# Patient Record
Sex: FEMALE | Race: BLACK OR AFRICAN AMERICAN | NOT HISPANIC OR LATINO | URBAN - METROPOLITAN AREA
[De-identification: names, ages, dates, MRNs, and addresses within clinical notes are randomized per-mention and may not be internally consistent; named-entity substitution may affect disease eponyms.]

---

## 2019-08-31 ENCOUNTER — INPATIENT (INPATIENT)
Facility: HOSPITAL | Age: 29
LOS: 11 days | Discharge: HOME HEALTH | End: 2019-09-12
Attending: OBSTETRICS & GYNECOLOGY | Admitting: OBSTETRICS & GYNECOLOGY
Payer: MEDICAID

## 2019-08-31 VITALS
RESPIRATION RATE: 16 BRPM | TEMPERATURE: 98 F | SYSTOLIC BLOOD PRESSURE: 192 MMHG | HEART RATE: 64 BPM | DIASTOLIC BLOOD PRESSURE: 113 MMHG | OXYGEN SATURATION: 99 %

## 2019-08-31 DIAGNOSIS — O14.90 UNSPECIFIED PRE-ECLAMPSIA, UNSPECIFIED TRIMESTER: ICD-10-CM

## 2019-08-31 DIAGNOSIS — O26.899 OTHER SPECIFIED PREGNANCY RELATED CONDITIONS, UNSPECIFIED TRIMESTER: ICD-10-CM

## 2019-08-31 DIAGNOSIS — O16.9 UNSPECIFIED MATERNAL HYPERTENSION, UNSPECIFIED TRIMESTER: ICD-10-CM

## 2019-08-31 DIAGNOSIS — Z3A.00 WEEKS OF GESTATION OF PREGNANCY NOT SPECIFIED: ICD-10-CM

## 2019-08-31 LAB
ALBUMIN SERPL ELPH-MCNC: 3.5 G/DL — SIGNIFICANT CHANGE UP (ref 3.3–5)
ALP SERPL-CCNC: 139 U/L — HIGH (ref 40–120)
ALT FLD-CCNC: 21 U/L — SIGNIFICANT CHANGE UP (ref 4–33)
ANION GAP SERPL CALC-SCNC: 12 MMO/L — SIGNIFICANT CHANGE UP (ref 7–14)
APPEARANCE UR: SIGNIFICANT CHANGE UP
APTT BLD: 26 SEC — LOW (ref 27.5–36.3)
AST SERPL-CCNC: 31 U/L — SIGNIFICANT CHANGE UP (ref 4–32)
BACTERIA # UR AUTO: SIGNIFICANT CHANGE UP
BASOPHILS # BLD AUTO: 0.02 K/UL — SIGNIFICANT CHANGE UP (ref 0–0.2)
BASOPHILS NFR BLD AUTO: 0.3 % — SIGNIFICANT CHANGE UP (ref 0–2)
BILIRUB SERPL-MCNC: 0.4 MG/DL — SIGNIFICANT CHANGE UP (ref 0.2–1.2)
BILIRUB UR-MCNC: NEGATIVE — SIGNIFICANT CHANGE UP
BLD GP AB SCN SERPL QL: NEGATIVE — SIGNIFICANT CHANGE UP
BLOOD UR QL VISUAL: SIGNIFICANT CHANGE UP
BUN SERPL-MCNC: 10 MG/DL — SIGNIFICANT CHANGE UP (ref 7–23)
CALCIUM SERPL-MCNC: 9.1 MG/DL — SIGNIFICANT CHANGE UP (ref 8.4–10.5)
CHLORIDE SERPL-SCNC: 106 MMOL/L — SIGNIFICANT CHANGE UP (ref 98–107)
CO2 SERPL-SCNC: 20 MMOL/L — LOW (ref 22–31)
COLOR SPEC: YELLOW — SIGNIFICANT CHANGE UP
CREAT ?TM UR-MCNC: 206.9 MG/DL — SIGNIFICANT CHANGE UP
CREAT SERPL-MCNC: 0.87 MG/DL — SIGNIFICANT CHANGE UP (ref 0.5–1.3)
EOSINOPHIL # BLD AUTO: 0.06 K/UL — SIGNIFICANT CHANGE UP (ref 0–0.5)
EOSINOPHIL NFR BLD AUTO: 0.9 % — SIGNIFICANT CHANGE UP (ref 0–6)
FIBRINOGEN PPP-MCNC: 576 MG/DL — HIGH (ref 350–510)
GLUCOSE SERPL-MCNC: 75 MG/DL — SIGNIFICANT CHANGE UP (ref 70–99)
GLUCOSE UR-MCNC: NEGATIVE — SIGNIFICANT CHANGE UP
HBV SURFACE AG SER-ACNC: NEGATIVE — SIGNIFICANT CHANGE UP
HCT VFR BLD CALC: 33.1 % — LOW (ref 34.5–45)
HGB BLD-MCNC: 10.1 G/DL — LOW (ref 11.5–15.5)
HIV COMBO RESULT: SIGNIFICANT CHANGE UP
HIV1+2 AB SPEC QL: SIGNIFICANT CHANGE UP
HYALINE CASTS # UR AUTO: SIGNIFICANT CHANGE UP
IMM GRANULOCYTES NFR BLD AUTO: 1 % — SIGNIFICANT CHANGE UP (ref 0–1.5)
INR BLD: 0.96 — SIGNIFICANT CHANGE UP (ref 0.88–1.17)
KETONES UR-MCNC: NEGATIVE — SIGNIFICANT CHANGE UP
LDH SERPL L TO P-CCNC: 350 U/L — HIGH (ref 135–225)
LEUKOCYTE ESTERASE UR-ACNC: NEGATIVE — SIGNIFICANT CHANGE UP
LYMPHOCYTES # BLD AUTO: 1.39 K/UL — SIGNIFICANT CHANGE UP (ref 1–3.3)
LYMPHOCYTES # BLD AUTO: 20.5 % — SIGNIFICANT CHANGE UP (ref 13–44)
MAGNESIUM SERPL-MCNC: 6 MG/DL — HIGH (ref 1.6–2.6)
MCHC RBC-ENTMCNC: 25.1 PG — LOW (ref 27–34)
MCHC RBC-ENTMCNC: 30.5 % — LOW (ref 32–36)
MCV RBC AUTO: 82.1 FL — SIGNIFICANT CHANGE UP (ref 80–100)
MONOCYTES # BLD AUTO: 0.62 K/UL — SIGNIFICANT CHANGE UP (ref 0–0.9)
MONOCYTES NFR BLD AUTO: 9.2 % — SIGNIFICANT CHANGE UP (ref 2–14)
NEUTROPHILS # BLD AUTO: 4.61 K/UL — SIGNIFICANT CHANGE UP (ref 1.8–7.4)
NEUTROPHILS NFR BLD AUTO: 68.1 % — SIGNIFICANT CHANGE UP (ref 43–77)
NITRITE UR-MCNC: NEGATIVE — SIGNIFICANT CHANGE UP
NRBC # FLD: 0.02 K/UL — SIGNIFICANT CHANGE UP (ref 0–0)
PH UR: 6.5 — SIGNIFICANT CHANGE UP (ref 5–8)
PLATELET # BLD AUTO: 202 K/UL — SIGNIFICANT CHANGE UP (ref 150–400)
PMV BLD: 11.8 FL — SIGNIFICANT CHANGE UP (ref 7–13)
POTASSIUM SERPL-MCNC: 4.4 MMOL/L — SIGNIFICANT CHANGE UP (ref 3.5–5.3)
POTASSIUM SERPL-SCNC: 4.4 MMOL/L — SIGNIFICANT CHANGE UP (ref 3.5–5.3)
PROT SERPL-MCNC: 6.6 G/DL — SIGNIFICANT CHANGE UP (ref 6–8.3)
PROT UR-MCNC: 593.6 MG/DL — SIGNIFICANT CHANGE UP
PROT UR-MCNC: 600 — HIGH
PROTHROM AB SERPL-ACNC: 10.9 SEC — SIGNIFICANT CHANGE UP (ref 9.8–13.1)
RBC # BLD: 4.03 M/UL — SIGNIFICANT CHANGE UP (ref 3.8–5.2)
RBC # FLD: 15.9 % — HIGH (ref 10.3–14.5)
RBC CASTS # UR COMP ASSIST: HIGH (ref 0–?)
RH IG SCN BLD-IMP: POSITIVE — SIGNIFICANT CHANGE UP
RH IG SCN BLD-IMP: POSITIVE — SIGNIFICANT CHANGE UP
SODIUM SERPL-SCNC: 138 MMOL/L — SIGNIFICANT CHANGE UP (ref 135–145)
SP GR SPEC: 1.03 — SIGNIFICANT CHANGE UP (ref 1–1.04)
SQUAMOUS # UR AUTO: SIGNIFICANT CHANGE UP
URATE SERPL-MCNC: 4.6 MG/DL — SIGNIFICANT CHANGE UP (ref 2.5–7)
UROBILINOGEN FLD QL: NORMAL — SIGNIFICANT CHANGE UP
WBC # BLD: 6.77 K/UL — SIGNIFICANT CHANGE UP (ref 3.8–10.5)
WBC # FLD AUTO: 6.77 K/UL — SIGNIFICANT CHANGE UP (ref 3.8–10.5)
WBC UR QL: HIGH (ref 0–?)

## 2019-08-31 RX ORDER — HYDRALAZINE HCL 50 MG
10 TABLET ORAL ONCE
Refills: 0 | Status: COMPLETED | OUTPATIENT
Start: 2019-08-31 | End: 2019-08-31

## 2019-08-31 RX ORDER — HYDRALAZINE HCL 50 MG
5 TABLET ORAL ONCE
Refills: 0 | Status: COMPLETED | OUTPATIENT
Start: 2019-08-31 | End: 2019-08-31

## 2019-08-31 RX ORDER — SODIUM CHLORIDE 9 MG/ML
1000 INJECTION, SOLUTION INTRAVENOUS
Refills: 0 | Status: DISCONTINUED | OUTPATIENT
Start: 2019-08-31 | End: 2019-09-01

## 2019-08-31 RX ORDER — LABETALOL HCL 100 MG
20 TABLET ORAL ONCE
Refills: 0 | Status: COMPLETED | OUTPATIENT
Start: 2019-08-31 | End: 2019-08-31

## 2019-08-31 RX ORDER — NIFEDIPINE 30 MG
30 TABLET, EXTENDED RELEASE 24 HR ORAL DAILY
Refills: 0 | Status: DISCONTINUED | OUTPATIENT
Start: 2019-08-31 | End: 2019-09-02

## 2019-08-31 RX ORDER — ONDANSETRON 8 MG/1
4 TABLET, FILM COATED ORAL ONCE
Refills: 0 | Status: COMPLETED | OUTPATIENT
Start: 2019-08-31 | End: 2019-08-31

## 2019-08-31 RX ORDER — MAGNESIUM SULFATE 500 MG/ML
4 VIAL (ML) INJECTION ONCE
Refills: 0 | Status: COMPLETED | OUTPATIENT
Start: 2019-08-31 | End: 2019-08-31

## 2019-08-31 RX ORDER — PROGESTERONE 200 MG/1
100 CAPSULE, LIQUID FILLED ORAL AT BEDTIME
Refills: 0 | Status: DISCONTINUED | OUTPATIENT
Start: 2019-08-31 | End: 2019-09-07

## 2019-08-31 RX ORDER — MAGNESIUM SULFATE 500 MG/ML
2 VIAL (ML) INJECTION
Qty: 40 | Refills: 0 | Status: DISCONTINUED | OUTPATIENT
Start: 2019-08-31 | End: 2019-09-01

## 2019-08-31 RX ORDER — OXYTOCIN 10 UNIT/ML
333.33 VIAL (ML) INJECTION
Qty: 20 | Refills: 0 | Status: DISCONTINUED | OUTPATIENT
Start: 2019-08-31 | End: 2019-09-08

## 2019-08-31 RX ADMIN — Medication 50 GM/HR: at 19:17

## 2019-08-31 RX ADMIN — SODIUM CHLORIDE 50 MILLILITER(S): 9 INJECTION, SOLUTION INTRAVENOUS at 12:48

## 2019-08-31 RX ADMIN — Medication 5 MILLIGRAM(S): at 12:21

## 2019-08-31 RX ADMIN — Medication 10 MILLIGRAM(S): at 12:33

## 2019-08-31 RX ADMIN — Medication 300 GRAM(S): at 12:44

## 2019-08-31 RX ADMIN — Medication 30 MILLIGRAM(S): at 20:38

## 2019-08-31 RX ADMIN — Medication 12 MILLIGRAM(S): at 12:42

## 2019-08-31 RX ADMIN — Medication 20 MILLIGRAM(S): at 19:09

## 2019-08-31 RX ADMIN — Medication 50 GM/HR: at 13:08

## 2019-08-31 NOTE — OB PROVIDER H&P - NSHPLABSRESULTS_GEN_ALL_CORE
HELLP Labs  Routine Labs [facility] Labs  08-31    138  |  106  |  10  ----------------------------<  75  4.4   |  20<L>  |  0.87    Ca    9.1      31 Aug 2019 12:39    TPro  6.6  /  Alb  3.5  /  TBili  0.4  /  DBili  x   /  AST  31  /  ALT  21  /  AlkPhos  139<H>  08-31  P/C ratio =     Routine Labs [facility] Labs  08-31    138  |  106  |  10  ----------------------------<  75  4.4   |  20<L>  |  0.87    Ca    9.1      31 Aug 2019 12:39    TPro  6.6  /  Alb  3.5  /  TBili  0.4  /  DBili  x   /  AST  31  /  ALT  21  /  AlkPhos  139<H>  08-31    P/C ratio = 2.8    Routine Labs

## 2019-08-31 NOTE — OB PROVIDER H&P - NSHPREVIEWOFSYSTEMS_GEN_ALL_CORE
Hypertension  B/L Brisk Reflexes + 3  B/L Pedal Edema +2    Vital Signs Last 24 Hrs  T(C): 36.8 (31 Aug 2019 12:53), Max: 36.9 (31 Aug 2019 12:03)  T(F): 98.2 (31 Aug 2019 12:53), Max: 98.4 (31 Aug 2019 12:03)  HR: 115 (31 Aug 2019 13:00) (64 - 115)  BP: 148/78 (31 Aug 2019 13:00) (142/85 - 192/113)  BP(mean): --  RR: 19 (31 Aug 2019 12:53) (16 - 19)  SpO2: 99% (31 Aug 2019 12:57) (99% - 100%)

## 2019-08-31 NOTE — OB PROVIDER H&P - HISTORY OF PRESENT ILLNESS
30 yo  @ 30.5 wks GA sent from OB's private office for evaluation of elevated BP in the office at 158/88 and reports on sono today baby measuring SGA as per patient report.   Patient denies any LOF, VB, CTX, and reports good FM's. Patient also reports no HA' s , Visual changes or N/V, Epigastric pain at this time  PNC: Dr Infante   Reports AP course thus far uncomplicated 30 yo  @ 30.5 wks GA sent from OB's private office for evaluation of elevated BP in the office at 158/88 and reports on sono today baby measuring SGA as per patient report.   Patient denies any LOF, VB, CTX, and reports good FM's. Patient also reports no HA' s , Visual changes or N/V, Epigastric pain at this time  PNC: Dr Infante   Reports AP course  was on Progesterone suppositories  since 20 wks GA last cervical length = 2.2 cm

## 2019-08-31 NOTE — CHART NOTE - NSCHARTNOTEFT_GEN_A_CORE
R3 MFM Consult Note (Back Time due to patient care)    Patient is a 29y old  at 30w5d who presents with a chief complaint of elevated BP in Dr. Infante office to 158/88 with first BP on arrival to triage 192/113. Hydralazine 5 given at that time with repeat /103 and hydralazine 10 given with BPs now 150s/80s. Patient denies any HA, blurry vision, RUQ pain. Denies any history of elevated BP previously and reports overall uncomplicated pregnancy to date, however, later reported she was found to have shortened cervix back in  and has been on nightly vaginal progesterone. Patient reports good FM. Denies any VB or LOF. States she is asymptomatic.     PNC: Dr Infante   Reports AP course  was on Progesterone suppositories since 20 wks GA last cervical length = 2.2 cm (31 Aug 2019 13:01)      PAST MEDICAL & SURGICAL HISTORY:  No pertinent past medical history  No significant past surgical history    MEDICATIONS  (STANDING):  betamethasone Injectable 12 milliGRAM(s) IntraMuscular every 24 hours  labetalol Injectable 20 milliGRAM(s) IV Push once  lactated ringers. 1000 milliLiter(s) (50 mL/Hr) IV Continuous <Continuous>  magnesium sulfate Infusion 2 Gm/Hr (50 mL/Hr) IV Continuous <Continuous>  NIFEdipine XL 30 milliGRAM(s) Oral daily  oxytocin Infusion 333.333 milliUNIT(s)/Min (1000 mL/Hr) IV Continuous <Continuous>      Vital Signs Last 24 Hrs  T(C): 36.3 (31 Aug 2019 17:03), Max: 36.9 (31 Aug 2019 12:03)  T(F): 97.34 (31 Aug 2019 17:03), Max: 98.4 (31 Aug 2019 12:03)  HR: 118 (31 Aug 2019 19:04) (64 - 125)  BP: 166/96 (31 Aug 2019 19:02) (138/84 - 192/113)  BP(mean): --  RR: 19 (31 Aug 2019 12:53) (16 - 19)  SpO2: 99% (31 Aug 2019 19:04) (98% - 100%)    PHYSICAL EXAM:  Gen: NAD  CV: RRR  Pulm: CTAB  Abd: soft, gravid,nontender  Ext: DTR 3+; 2+ pedal edema bilaterally       I&O's Summary    31 Aug 2019 07:01  -  31 Aug 2019 19:09  --------------------------------------------------------  IN: 600 mL / OUT: 400 mL / NET: 200 mL        LABORATORY:                        10.1   6.77  )-----------( 202      ( 31 Aug 2019 12:39 )             33.1         138  |  106  |  10  ----------------------------<  75  4.4   |  20<L>  |  0.87    Ca    9.1      31 Aug 2019 12:39    TPro  6.6  /  Alb  3.5  /  TBili  0.4  /  DBili  x   /  AST  31  /  ALT  21  /  AlkPhos  139<H>      PT/INR - ( 31 Aug 2019 12:39 )   PT: 10.9 SEC;   INR: 0.96          PTT - ( 31 Aug 2019 12:39 )  PTT:26.0 SEC  Urinalysis Basic - ( 31 Aug 2019 12:39 )    Color: YELLOW / Appearance: Lt TURBID / S.027 / pH: 6.5  Gluc: NEGATIVE / Ketone: NEGATIVE  / Bili: NEGATIVE / Urobili: NORMAL   Blood: SMALL / Protein: 600 / Nitrite: NEGATIVE   Leuk Esterase: NEGATIVE / RBC: 11-25 / WBC 6-10   Sq Epi: MODERATE / Non Sq Epi: x / Bacteria: FEW      RADIOLOGY    Bedside sono performed by EDWIGE Caceres   Vertex, anterior placenta, MVP 5.7, EFW 1304g, BPP 8/8    TVUS performed by EDWIGE Caceres with myself at bedside   CL 2.4-2.8cm        A/P: 29y old  at 30w5d w/ history of shortened cervix this pregnancy, who presents with elevated BP, found to be sPEC and started on magnesium, in stable condition.   - Admit to Antepartum service    #sPEC/Mg  - S/p hydralazine 5 and hydralazine 10. Begin magnesium sulfate for seizure prophylaxis. Risks/benefits d/w patient and all questions addressed.   - Plan to continue to monitor BP, if oral anti-HTN indicated, will start Procardia 30XL     #MWB  - NPO for observation  - If not in labor, will begin HSQ    #FWB  - BMZ given for fetal lung maturity   - Continuous monitoring   - GBS obtained     Seen w/ Dr. Ramos Cervantes PGY3 R3 MFM Consult Note (Back Time due to patient care)    Patient is a 29y old  at 30w5d who presents with a chief complaint of elevated BP in Dr. Infante office to 158/88 with first BP on arrival to triage 192/113. Hydralazine 5 given at that time with repeat /103 and hydralazine 10 given with BPs now 150s/80s. Patient denies any HA, blurry vision, RUQ pain. Denies any history of elevated BP previously and reports overall uncomplicated pregnancy to date, however, later reported she was found to have shortened cervix back in  and has been on nightly vaginal progesterone. Patient reports good FM. Denies any VB or LOF. States she is asymptomatic.     PNC: Dr Infante   Reports AP course  was on Progesterone suppositories since 20 wks GA last cervical length = 2.2 cm (31 Aug 2019 13:01)      PAST MEDICAL & SURGICAL HISTORY:  No pertinent past medical history  No significant past surgical history    MEDICATIONS  (STANDING):  betamethasone Injectable 12 milliGRAM(s) IntraMuscular every 24 hours  labetalol Injectable 20 milliGRAM(s) IV Push once  lactated ringers. 1000 milliLiter(s) (50 mL/Hr) IV Continuous <Continuous>  magnesium sulfate Infusion 2 Gm/Hr (50 mL/Hr) IV Continuous <Continuous>  NIFEdipine XL 30 milliGRAM(s) Oral daily  oxytocin Infusion 333.333 milliUNIT(s)/Min (1000 mL/Hr) IV Continuous <Continuous>      VS on arrival:  /113  HR 64    PHYSICAL EXAM:  Gen: NAD  CV: RRR  Pulm: CTAB  Abd: soft, gravid,nontender  Ext: DTR 3+; 2+ pedal edema bilaterally     FHR: 145/mod/accels+/decels-  [place]: rare, infrequent      I&O's Summary    31 Aug 2019 07:01  -  31 Aug 2019 19:09  --------------------------------------------------------  IN: 600 mL / OUT: 400 mL / NET: 200 mL        LABORATORY:                        10.1   6.77  )-----------( 202      ( 31 Aug 2019 12:39 )             33.1     08-31    138  |  106  |  10  ----------------------------<  75  4.4   |  20<L>  |  0.87    Ca    9.1      31 Aug 2019 12:39    TPro  6.6  /  Alb  3.5  /  TBili  0.4  /  DBili  x   /  AST  31  /  ALT  21  /  AlkPhos  139<H>      PT/INR - ( 31 Aug 2019 12:39 )   PT: 10.9 SEC;   INR: 0.96          PTT - ( 31 Aug 2019 12:39 )  PTT:26.0 SEC  Urinalysis Basic - ( 31 Aug 2019 12:39 )    Color: YELLOW / Appearance: Lt TURBID / S.027 / pH: 6.5  Gluc: NEGATIVE / Ketone: NEGATIVE  / Bili: NEGATIVE / Urobili: NORMAL   Blood: SMALL / Protein: 600 / Nitrite: NEGATIVE   Leuk Esterase: NEGATIVE / RBC: 11-25 / WBC 6-10   Sq Epi: MODERATE / Non Sq Epi: x / Bacteria: FEW      RADIOLOGY    Bedside sono performed by EDWIGE Caceres   Vertex, anterior placenta, MVP 5.7, EFW 1304g, BPP 8/8    TVUS performed by EDWIGE Caceres with myself at bedside   CL 2.4-2.8cm        A/P: 29y old  at 30w5d w/ history of shortened cervix this pregnancy, who presents with elevated BP, found to be sPEC and started on magnesium, in stable condition.   - Admit to Antepartum service    #sPEC/Mg  - S/p hydralazine 5 and hydralazine 10. Begin magnesium sulfate for seizure prophylaxis. Risks/benefits d/w patient and all questions addressed.   - Plan to continue to monitor BP, if oral anti-HTN indicated, will start Procardia 30XL     #MWB  - NPO for observation  - If not in labor, will begin HSQ    #FWB  - BMZ given for fetal lung maturity   - Continuous monitoring   - GBS obtained     Seen w/ Dr. Ramos Cervantes PGY3 R3 MFM Consult Note (Back Time due to patient care)    Patient is a 29y old  at 30w5d who presents with a chief complaint of elevated BP in Dr. Infante office to 158/88 with first BP on arrival to triage 192/113. Hydralazine 5 given at that time with repeat /103 and hydralazine 10 given with BPs now 150s/80s. Patient denies any HA, blurry vision, RUQ pain. Denies any history of elevated BP previously and reports overall uncomplicated pregnancy to date, however, later reported she was found to have shortened cervix back in  and has been on nightly vaginal progesterone. Patient reports good FM. Denies any VB or LOF. States she is asymptomatic.     PNC: Dr Infante   Reports AP course  was on Progesterone suppositories since 20 wks GA last cervical length = 2.2 cm (31 Aug 2019 13:01)      PAST MEDICAL & SURGICAL HISTORY:  No pertinent past medical history  No significant past surgical history    MEDICATIONS  (STANDING):  betamethasone Injectable 12 milliGRAM(s) IntraMuscular every 24 hours  labetalol Injectable 20 milliGRAM(s) IV Push once  lactated ringers. 1000 milliLiter(s) (50 mL/Hr) IV Continuous <Continuous>  magnesium sulfate Infusion 2 Gm/Hr (50 mL/Hr) IV Continuous <Continuous>  NIFEdipine XL 30 milliGRAM(s) Oral daily  oxytocin Infusion 333.333 milliUNIT(s)/Min (1000 mL/Hr) IV Continuous <Continuous>      VS on arrival:  /113  HR 64    PHYSICAL EXAM:  Gen: NAD  CV: RRR  Pulm: CTAB  Abd: soft, gravid,nontender  Ext: DTR 3+; 2+ pedal edema bilaterally     FHR: 145/mod/accels+/decels-  [place]: rare, infrequent      I&O's Summary    31 Aug 2019 07:01  -  31 Aug 2019 19:09  --------------------------------------------------------  IN: 600 mL / OUT: 400 mL / NET: 200 mL        LABORATORY:                        10.1   6.77  )-----------( 202      ( 31 Aug 2019 12:39 )             33.1     08-31    138  |  106  |  10  ----------------------------<  75  4.4   |  20<L>  |  0.87    Ca    9.1      31 Aug 2019 12:39    TPro  6.6  /  Alb  3.5  /  TBili  0.4  /  DBili  x   /  AST  31  /  ALT  21  /  AlkPhos  139<H>      PT/INR - ( 31 Aug 2019 12:39 )   PT: 10.9 SEC;   INR: 0.96          PTT - ( 31 Aug 2019 12:39 )  PTT:26.0 SEC  Urinalysis Basic - ( 31 Aug 2019 12:39 )    Color: YELLOW / Appearance: Lt TURBID / S.027 / pH: 6.5  Gluc: NEGATIVE / Ketone: NEGATIVE  / Bili: NEGATIVE / Urobili: NORMAL   Blood: SMALL / Protein: 600 / Nitrite: NEGATIVE   Leuk Esterase: NEGATIVE / RBC: 11-25 / WBC 6-10   Sq Epi: MODERATE / Non Sq Epi: x / Bacteria: FEW      RADIOLOGY    Bedside sono performed by EDWIGE Caceres   Vertex, anterior placenta, MVP 5.7, EFW 1304g, BPP 8/8    TVUS performed by EDWIGE Caceres with myself at bedside   CL 2.4-2.8cm        A/P: 29y old  at 30w5d w/ history of shortened cervix this pregnancy, who presents with elevated BP, found to be sPEC and started on magnesium, in stable condition.   - Admit to Antepartum service    #sPEC/Mg  - S/p hydralazine 5 and hydralazine 10. Begin magnesium sulfate for seizure prophylaxis. Risks/benefits d/w patient and all questions addressed.   - Plan to continue to monitor BP, if oral anti-HTN indicated, will start Procardia 30XL     #MWB  - NPO for observation  - If not in labor, will begin HSQ    #FWB  - BMZ given for fetal lung maturity   - Continuous monitoring   - GBS obtained     Seen w/ Dr. Ramos Cervantes PGY3        MFM Attending  Patient seen and evaluated at the bedside. New onset HTN and asymptomatic. Differential includes pre-eclampsia with or without severe features. I agree with the above plan and counseled the patient. Magnesium for seizure prophylaxis until we decide plan based on BP and symtpoms.  Dr. Beasley

## 2019-08-31 NOTE — OB PROVIDER H&P - ASSESSMENT
28 yo  @ 30.5 wks GA admitted for Severe Preeclampsia  - HELLP Labs   - BP Control  - IVP Medications administered after severe range BP's of 179/103 and 168/101 with Pulse rate in mid 60's initially  Hydralazine IVP administered 5 mg x1 , 10 mg x 1 (Will continue ro observe and cycle BP's)  - MFM ( Dr Beasley at the bedside for evaluation)  - Magnesium fo seizure prophylaxis  - Betamethasone for fetal maturity  - NICU Consult  - EFW Sono to be performed  - See all admission orders as Dw Dr Infante (OB Attending) and Dr Beasley (MFM Attending)

## 2019-08-31 NOTE — OB PROVIDER TRIAGE NOTE - HISTORY OF PRESENT ILLNESS
28 yo  @ 30.5 wks GA sent from OB's private office for evaluation of elevated BP in the office at 158/88 and reports on sono today baby measuring SGA as per patient report.   Patient denies any LOF, VB, CTX, and reports good FM's. Patient also reports no HA' s , Visual changes or N/V, Epigastric pain at this time  PNC: Dr Infante   Reports AP course thus far uncomplicated

## 2019-08-31 NOTE — OB RN PATIENT PROFILE - NS_CONTACTNUMBEROFSUPPORTPERSON_OBGYN_ALL_OB_FT
Other (Free Text): Eczema doing well Note Text (......Xxx Chief Complaint.): This diagnosis correlates with the Detail Level: Detailed [id number]

## 2019-08-31 NOTE — OB PROVIDER TRIAGE NOTE - NSHPPHYSICALEXAM_GEN_ALL_CORE
A/O x 3  Vital Signs Last 24 Hrs  T(C): 36.9 (31 Aug 2019 12:03), Max: 36.9 (31 Aug 2019 12:03)  T(F): 98.4 (31 Aug 2019 12:03), Max: 98.4 (31 Aug 2019 12:03)  HR: 113 (31 Aug 2019 12:52) (64 - 113)  BP: 142/85 (31 Aug 2019 12:50) (142/85 - 192/113)  BP(mean): --  RR: 16 (31 Aug 2019 12:03) (16 - 16)  SpO2: 99% (31 Aug 2019 12:52) (99% - 100%)  Abdomen is soft NT and gravid with no pain in RUQ pain illicited  DTR (3 + Brisk) B/L  2 + pedal edema

## 2019-08-31 NOTE — OB PROVIDER TRIAGE NOTE - PMH
<<----- Click to add NO pertinent Past Medical History No pertinent past medical history PAIN/TENDERNESS

## 2019-09-01 DIAGNOSIS — O16.9 UNSPECIFIED MATERNAL HYPERTENSION, UNSPECIFIED TRIMESTER: ICD-10-CM

## 2019-09-01 LAB
MAGNESIUM SERPL-MCNC: 5.8 MG/DL — HIGH (ref 1.6–2.6)
MAGNESIUM SERPL-MCNC: 6.9 MG/DL — HIGH (ref 1.6–2.6)
T PALLIDUM AB TITR SER: NEGATIVE — SIGNIFICANT CHANGE UP

## 2019-09-01 RX ORDER — MAGNESIUM SULFATE 500 MG/ML
1.5 VIAL (ML) INJECTION
Qty: 40 | Refills: 0 | Status: DISCONTINUED | OUTPATIENT
Start: 2019-09-01 | End: 2019-09-01

## 2019-09-01 RX ORDER — HEPARIN SODIUM 5000 [USP'U]/ML
5000 INJECTION INTRAVENOUS; SUBCUTANEOUS EVERY 12 HOURS
Refills: 0 | Status: DISCONTINUED | OUTPATIENT
Start: 2019-09-01 | End: 2019-09-02

## 2019-09-01 RX ORDER — SODIUM CHLORIDE 9 MG/ML
3 INJECTION INTRAMUSCULAR; INTRAVENOUS; SUBCUTANEOUS EVERY 8 HOURS
Refills: 0 | Status: DISCONTINUED | OUTPATIENT
Start: 2019-09-01 | End: 2019-09-07

## 2019-09-01 RX ORDER — SODIUM CHLORIDE 9 MG/ML
1000 INJECTION, SOLUTION INTRAVENOUS
Refills: 0 | Status: DISCONTINUED | OUTPATIENT
Start: 2019-09-01 | End: 2019-09-03

## 2019-09-01 RX ADMIN — SODIUM CHLORIDE 62.5 MILLILITER(S): 9 INJECTION, SOLUTION INTRAVENOUS at 12:26

## 2019-09-01 RX ADMIN — Medication 12 MILLIGRAM(S): at 12:25

## 2019-09-01 RX ADMIN — PROGESTERONE 100 MILLIGRAM(S): 200 CAPSULE, LIQUID FILLED ORAL at 01:38

## 2019-09-01 RX ADMIN — SODIUM CHLORIDE 3 MILLILITER(S): 9 INJECTION INTRAMUSCULAR; INTRAVENOUS; SUBCUTANEOUS at 22:17

## 2019-09-01 RX ADMIN — SODIUM CHLORIDE 50 MILLILITER(S): 9 INJECTION, SOLUTION INTRAVENOUS at 07:33

## 2019-09-01 RX ADMIN — Medication 30 MILLIGRAM(S): at 20:32

## 2019-09-01 RX ADMIN — PROGESTERONE 100 MILLIGRAM(S): 200 CAPSULE, LIQUID FILLED ORAL at 22:17

## 2019-09-01 RX ADMIN — Medication 37.5 GM/HR: at 08:04

## 2019-09-01 RX ADMIN — Medication 1 TABLET(S): at 15:04

## 2019-09-01 RX ADMIN — HEPARIN SODIUM 5000 UNIT(S): 5000 INJECTION INTRAVENOUS; SUBCUTANEOUS at 20:00

## 2019-09-01 RX ADMIN — HEPARIN SODIUM 5000 UNIT(S): 5000 INJECTION INTRAVENOUS; SUBCUTANEOUS at 07:33

## 2019-09-01 NOTE — PROGRESS NOTE ADULT - SUBJECTIVE AND OBJECTIVE BOX
R3 Antepartum Progress Note - HD#2    Subjective  Patient seen and examined at bedside, no acute overnight events. This AM, no acute complaints. Reports good FM and denies     Objective  Vital Signs Last 24 Hours  T(C): 36.5 (09-01-19 @ 01:29), Max: 36.9 (08-31-19 @ 12:03)  HR: 88 (09-01-19 @ 03:09) (64 - 126)  BP: 142/95 (09-01-19 @ 03:08) (130/79 - 192/113)  RR: 19 (08-31-19 @ 12:53) (16 - 19)  SpO2: 98% (09-01-19 @ 03:09) (96% - 100%)    Physical Exam:  General: NAD  Abdomen: Soft, non-tender, non-distended, fundus firm  Pelvic: Lochia wnl    Labs:    Blood Type: A Positive  Antibody Screen: --               10.1   6.77  )-----------( 202      ( 08-31 @ 12:39 )             33.1         MEDICATIONS  (STANDING):  betamethasone Injectable 12 milliGRAM(s) IntraMuscular every 24 hours  lactated ringers. 1000 milliLiter(s) (50 mL/Hr) IV Continuous <Continuous>  magnesium sulfate Infusion 2 Gm/Hr (50 mL/Hr) IV Continuous <Continuous>  NIFEdipine XL 30 milliGRAM(s) Oral daily  ondansetron Injectable 4 milliGRAM(s) IV Push once  oxytocin Infusion 333.333 milliUNIT(s)/Min (1000 mL/Hr) IV Continuous <Continuous>  progesterone Vaginal Insert 100 milliGRAM(s) Vaginal at bedtime    MEDICATIONS  (PRN): R3 Antepartum Progress Note - HD#2    Subjective  Patient seen and examined at bedside, no acute overnight events. This AM, no acute complaints. Reports good FM and denies CTX, LOF, VB. Denies HA, changes in vision, CP, SOB, palpitations, LE edema/tenderness.      Objective  Vital Signs Last 24 Hours  T(C): 36.5 (09-01-19 @ 01:29), Max: 36.9 (08-31-19 @ 12:03)  HR: 88 (09-01-19 @ 03:09) (64 - 126)  BP: 142/95 (09-01-19 @ 03:08) (130/79 - 192/113)  RR: 19 (08-31-19 @ 12:53) (16 - 19)  SpO2: 98% (09-01-19 @ 03:09) (96% - 100%)    Physical Exam:  General: NAD  CV: RRR, no murmurs, S1, S2  Resp: CTA-B, symmetrical expansion  Abdomen: Soft, non-tender, non-distended, gravid  Ext: no edema/tenderness in LE b/l  Neuro: DTRs radial/patellar 2+ b/l    Labs:    Blood Type: A Positive  Antibody Screen: --               10.1   6.77  )-----------( 202      ( 08-31 @ 12:39 )             33.1     08-31-19 @ 12:39    138  |  106  |  10             --------------------------< 75     4.4  |  20<L>  | 0.87    eGFR AA: 104  eGFR N-AA: 89    Calcium: 9.1  Phosphorus: --  Magnesium: --    AST: 31    ALT: 21  AlkPhos: 139<H>  Protein: 6.6  Albumin: 3.5  TBili: 0.4  D-Bili: --      MEDICATIONS  (STANDING):  betamethasone Injectable 12 milliGRAM(s) IntraMuscular every 24 hours  lactated ringers. 1000 milliLiter(s) (50 mL/Hr) IV Continuous <Continuous>  magnesium sulfate Infusion 2 Gm/Hr (50 mL/Hr) IV Continuous <Continuous>  NIFEdipine XL 30 milliGRAM(s) Oral daily  ondansetron Injectable 4 milliGRAM(s) IV Push once  oxytocin Infusion 333.333 milliUNIT(s)/Min (1000 mL/Hr) IV Continuous <Continuous>  progesterone Vaginal Insert 100 milliGRAM(s) Vaginal at bedtime    MEDICATIONS  (PRN):

## 2019-09-01 NOTE — PROVIDER CONTACT NOTE (OTHER) - SITUATION
Pt /94 with repeat 152/86 10 minutes later.  Bp denies HA, SOB, blurry vision.  Increase PO antihypertensives recommended.

## 2019-09-01 NOTE — PROGRESS NOTE ADULT - PROBLEM SELECTOR PLAN 1
1. Materna well being  - CV: hemodynamically stable, continue procardia 30mg XL for BP control   - Resp: saturating well, will continue to monitor for signs of Mg toxicity  - GI: reg diet  - Heme: HSQ for DVT ppx  - OB: vaginal progesterone for cervical insufficiency  - FEN: LR@50  - Neuro: Mg for 24 hours for seizure prophylaxis    2. Fetal well being  - NST BID  - BPP 1. Materna well being  - CV: hemodynamically stable, continue procardia 30mg XL for BP control   - Resp: saturating well, will continue to monitor for signs of Mg toxicity  - GI: reg diet  - Heme: HSQ for DVT ppx  - OB: vaginal progesterone for cervical insufficiency  - FEN: LR@50  - Neuro: Mg for 24 hours for seizure prophylaxis    2. Fetal well being  - NST BID  - BPP Mon  - BMZ for fetal lung maturity  - PNV    AMERICA Mcdonough pgy3 1. Maternal well being  - CV: hemodynamically stable, continue procardia 30mg XL for BP control   - Resp: saturating well, will continue to monitor for signs of Mg toxicity  - GI: reg diet  - Heme: HSQ for DVT ppx  - OB: vaginal progesterone for cervical insufficiency  - FEN: LR@50  - Neuro: Mg for 24 hours for seizure prophylaxis    2. Fetal well being  - NST BID  - BPP Mon  - BMZ for fetal lung maturity  - PNV    AMERICA Mcdonough pgy3

## 2019-09-01 NOTE — CHART NOTE - NSCHARTNOTEFT_GEN_A_CORE
Consult note:        I met with Ms. Marcelo on the LDR 3 and discussed what to expect should she deliver at 30.6 weeks gestation.    1.	The NICU team will be present at her delivery and will immediately assess and care for her infant.  2.	Overall survival at 30.6 weeks gestation is >95%.   4.	The infant will likely require respiratory support, either in the form of nasal CPAP or intubation and mechanical ventilation.  5.	The infant will be at risk for jaundice which can be treated with phototherapy.  6.	Depending on the clinical status of the infant, the infant will receive IVF/IV nutrition as necessary. In order to have IV access lines the umbilicus line may be place and later long term lines such as PICC lines may be needed to receive IV fluids/nutrition and medications.  7.            The infant is also at risk for hypoglycemia. Due to immature suck/swallow, the infant may require an orogastric tube once feeds are initiated.   8.	The infant will be screened for infection and treated with antibiotics at birth. If the infant's clinical status changes during his NICU course, he will again be screened and treated for infection.   9.	The infant will develop anemia of prematurity, and will likely require blood transfusions.   10.	The infant is at increased risk for intraventricular hemorrhage, which may result in severe developmental delays. Even in the absence of IVH the infant is at risk for developmental delays as a consequence of prematurity. The infant will be followed by a developmental pediatrician after                         discharge from the NICU to monitor for neurodevelopmental delays.  11.	The infant is at risk for thermoregulation issues.    12.	All premature infants are at risk for developmental delays and will be monitored for the first two years of life by developmental pediatricians.   13.	Average length of stay is about 4-5 weeks.    Ms. Marcelo had the opportunity to ask questions and may contact the NICU at any time if further question arise.    Thank you for the opportunity to participate in the care of this patient and please inform us of any changes in her status.  Consult note:        I met with Ms. Marcelo on the LDR 3 and discussed what to expect should she deliver at 30.6 weeks gestation.    1.	The NICU team will be present at her delivery and will immediately assess and care for her infant.  2.	Overall survival at 30.6 weeks gestation is >95%.   4.	The infant will likely require respiratory support, either in the form of nasal CPAP or intubation and mechanical ventilation.  5.	The infant will be at risk for jaundice which can be treated with phototherapy.  6.	Depending on the clinical status of the infant, the infant will receive IVF/IV nutrition as necessary. In order to have IV access lines the umbilicus line may be place and later long term lines such as PICC lines may be needed to receive IV fluids/nutrition and medications.  7.            The infant is also at risk for hypoglycemia. Due to immature suck/swallow, the infant may require an orogastric tube once feeds are initiated.   8.	The infant will be screened for infection and treated with antibiotics at birth. If the infant's clinical status changes during his NICU course, he will again be screened and treated for infection.   9.	The infant will develop anemia of prematurity, and will likely require blood transfusions.   10.	The infant is at increased risk for intraventricular hemorrhage, which may result in severe developmental delays. Even in the absence of IVH the infant is at risk for developmental delays as a consequence of prematurity. The infant will be followed by a developmental pediatrician after                         discharge from the NICU to monitor for neurodevelopmental delays.  11.	The infant is at risk for thermoregulation issues.    12.	All premature infants are at risk for developmental delays and will be monitored for the first two years of life by developmental pediatricians.   13.	Average length of stay is about 4-5 weeks.    Ms. Marcelo had the opportunity to ask questions and may contact the NICU at any time if further question arise.    Thank you for the opportunity to participate in the care of this patient and please inform us of any changes in her status.    Lux ONEILL  F-1, neonatology

## 2019-09-01 NOTE — CHART NOTE - NSCHARTNOTEFT_GEN_A_CORE
R3 OB Note     Patient seen at bedside. Patient reports feeling well, asymptomatic. Denies any HA, blurry vision, RUQ pain. BPs throughout morning WNL, magnesium sulfate D/C. Patient did have one isolated elevated /94 with repeat 152/86. Patient asymptomatic at that time. Feeling well and without complaint.     VS  T(C): 36.4 (19 @ 15:29)  HR: 99 (19 @ 17:13)  BP: 156/91 (19 @ 17:13)  RR: --  SpO2: 100% (19 @ 13:37)    Physical Exam:  General: NAD  CV: RRR  Lungs: CTA-B  Abdomen: Soft, gravid, nontender   Ext: No pain or swelling    VE: deferred   FHT: 125/mod/accels+/decels-; category I  [place]: none     A/P: 28 yo  30w5d a/w sPEC on magnesium, now d/c, in stable condition. Clinical condition improving with stabilization of BPs. Antepartum course complicated by cervical insufficiency with no acute changes upon admission.  - Magnesium d/c   - Continue Procardia 30XL and continue to monitor BP  - AM HELLP labs   - Ok to come off NST at this time     D/w Dr. Naresh Cervantes, PGY3

## 2019-09-01 NOTE — PROGRESS NOTE ADULT - ASSESSMENT
30 yo  30w5d a/w sPEC requiring hydralazine 5mg, 10mg IVP and labetalol 20mg IVP. Clinical condition improving with stabilization of BPs. Antepartum course complicated by cervical insufficiency with no acute changes upon admission.

## 2019-09-02 DIAGNOSIS — Z3A.30 30 WEEKS GESTATION OF PREGNANCY: ICD-10-CM

## 2019-09-02 DIAGNOSIS — O14.93 UNSPECIFIED PRE-ECLAMPSIA, THIRD TRIMESTER: ICD-10-CM

## 2019-09-02 LAB
ALBUMIN SERPL ELPH-MCNC: 3.3 G/DL — SIGNIFICANT CHANGE UP (ref 3.3–5)
ALP SERPL-CCNC: 124 U/L — HIGH (ref 40–120)
ALT FLD-CCNC: 23 U/L — SIGNIFICANT CHANGE UP (ref 4–33)
ANION GAP SERPL CALC-SCNC: 11 MMO/L — SIGNIFICANT CHANGE UP (ref 7–14)
APTT BLD: 26.5 SEC — LOW (ref 27.5–36.3)
AST SERPL-CCNC: 28 U/L — SIGNIFICANT CHANGE UP (ref 4–32)
BASOPHILS # BLD AUTO: 0.01 K/UL — SIGNIFICANT CHANGE UP (ref 0–0.2)
BASOPHILS NFR BLD AUTO: 0.1 % — SIGNIFICANT CHANGE UP (ref 0–2)
BILIRUB SERPL-MCNC: 0.3 MG/DL — SIGNIFICANT CHANGE UP (ref 0.2–1.2)
BLD GP AB SCN SERPL QL: NEGATIVE — SIGNIFICANT CHANGE UP
BUN SERPL-MCNC: 17 MG/DL — SIGNIFICANT CHANGE UP (ref 7–23)
CALCIUM SERPL-MCNC: 8.2 MG/DL — LOW (ref 8.4–10.5)
CHLORIDE SERPL-SCNC: 107 MMOL/L — SIGNIFICANT CHANGE UP (ref 98–107)
CO2 SERPL-SCNC: 20 MMOL/L — LOW (ref 22–31)
CREAT SERPL-MCNC: 0.88 MG/DL — SIGNIFICANT CHANGE UP (ref 0.5–1.3)
EOSINOPHIL # BLD AUTO: 0 K/UL — SIGNIFICANT CHANGE UP (ref 0–0.5)
EOSINOPHIL NFR BLD AUTO: 0 % — SIGNIFICANT CHANGE UP (ref 0–6)
FIBRINOGEN PPP-MCNC: 454 MG/DL — SIGNIFICANT CHANGE UP (ref 350–510)
GLUCOSE SERPL-MCNC: 94 MG/DL — SIGNIFICANT CHANGE UP (ref 70–99)
HCT VFR BLD CALC: 27.6 % — LOW (ref 34.5–45)
HGB BLD-MCNC: 8.6 G/DL — LOW (ref 11.5–15.5)
IMM GRANULOCYTES NFR BLD AUTO: 2 % — HIGH (ref 0–1.5)
INR BLD: 0.97 — SIGNIFICANT CHANGE UP (ref 0.88–1.17)
LDH SERPL L TO P-CCNC: 318 U/L — HIGH (ref 135–225)
LYMPHOCYTES # BLD AUTO: 1.36 K/UL — SIGNIFICANT CHANGE UP (ref 1–3.3)
LYMPHOCYTES # BLD AUTO: 15.1 % — SIGNIFICANT CHANGE UP (ref 13–44)
M PROTEIN 24H MFR UR ELPH: 3333 MG/24 HR — SIGNIFICANT CHANGE UP
MAGNESIUM SERPL-MCNC: 5.6 MG/DL — HIGH (ref 1.6–2.6)
MCHC RBC-ENTMCNC: 25.5 PG — LOW (ref 27–34)
MCHC RBC-ENTMCNC: 31.2 % — LOW (ref 32–36)
MCV RBC AUTO: 81.9 FL — SIGNIFICANT CHANGE UP (ref 80–100)
MONOCYTES # BLD AUTO: 0.97 K/UL — HIGH (ref 0–0.9)
MONOCYTES NFR BLD AUTO: 10.8 % — SIGNIFICANT CHANGE UP (ref 2–14)
NEUTROPHILS # BLD AUTO: 6.48 K/UL — SIGNIFICANT CHANGE UP (ref 1.8–7.4)
NEUTROPHILS NFR BLD AUTO: 72 % — SIGNIFICANT CHANGE UP (ref 43–77)
NRBC # FLD: 0.03 K/UL — SIGNIFICANT CHANGE UP (ref 0–0)
PLATELET # BLD AUTO: 235 K/UL — SIGNIFICANT CHANGE UP (ref 150–400)
PMV BLD: 12 FL — SIGNIFICANT CHANGE UP (ref 7–13)
POTASSIUM SERPL-MCNC: 4.5 MMOL/L — SIGNIFICANT CHANGE UP (ref 3.5–5.3)
POTASSIUM SERPL-SCNC: 4.5 MMOL/L — SIGNIFICANT CHANGE UP (ref 3.5–5.3)
PROT SERPL-MCNC: 6.2 G/DL — SIGNIFICANT CHANGE UP (ref 6–8.3)
PROTHROM AB SERPL-ACNC: 11 SEC — SIGNIFICANT CHANGE UP (ref 9.8–13.1)
RBC # BLD: 3.37 M/UL — LOW (ref 3.8–5.2)
RBC # FLD: 16.4 % — HIGH (ref 10.3–14.5)
RH IG SCN BLD-IMP: POSITIVE — SIGNIFICANT CHANGE UP
RUBV IGG SER-ACNC: 2.8 INDEX — SIGNIFICANT CHANGE UP
RUBV IGG SER-IMP: POSITIVE — SIGNIFICANT CHANGE UP
SODIUM SERPL-SCNC: 138 MMOL/L — SIGNIFICANT CHANGE UP (ref 135–145)
SPECIMEN SOURCE: SIGNIFICANT CHANGE UP
SPECIMEN VOL 24H UR: 1675 ML — SIGNIFICANT CHANGE UP
T PALLIDUM AB TITR SER: NEGATIVE — SIGNIFICANT CHANGE UP
URATE SERPL-MCNC: 6 MG/DL — SIGNIFICANT CHANGE UP (ref 2.5–7)
WBC # BLD: 9 K/UL — SIGNIFICANT CHANGE UP (ref 3.8–10.5)
WBC # FLD AUTO: 9 K/UL — SIGNIFICANT CHANGE UP (ref 3.8–10.5)

## 2019-09-02 RX ORDER — LABETALOL HCL 100 MG
200 TABLET ORAL THREE TIMES A DAY
Refills: 0 | Status: DISCONTINUED | OUTPATIENT
Start: 2019-09-02 | End: 2019-09-06

## 2019-09-02 RX ORDER — HYDRALAZINE HCL 50 MG
40 TABLET ORAL ONCE
Refills: 0 | Status: DISCONTINUED | OUTPATIENT
Start: 2019-09-02 | End: 2019-09-02

## 2019-09-02 RX ORDER — NIFEDIPINE 30 MG
60 TABLET, EXTENDED RELEASE 24 HR ORAL DAILY
Refills: 0 | Status: DISCONTINUED | OUTPATIENT
Start: 2019-09-02 | End: 2019-09-07

## 2019-09-02 RX ORDER — HYDRALAZINE HCL 50 MG
10 TABLET ORAL ONCE
Refills: 0 | Status: COMPLETED | OUTPATIENT
Start: 2019-09-02 | End: 2019-09-02

## 2019-09-02 RX ORDER — MAGNESIUM SULFATE 500 MG/ML
4 VIAL (ML) INJECTION ONCE
Refills: 0 | Status: COMPLETED | OUTPATIENT
Start: 2019-09-02 | End: 2019-09-02

## 2019-09-02 RX ORDER — LABETALOL HCL 100 MG
40 TABLET ORAL ONCE
Refills: 0 | Status: COMPLETED | OUTPATIENT
Start: 2019-09-02 | End: 2019-09-02

## 2019-09-02 RX ORDER — LABETALOL HCL 100 MG
20 TABLET ORAL ONCE
Refills: 0 | Status: COMPLETED | OUTPATIENT
Start: 2019-09-02 | End: 2019-09-02

## 2019-09-02 RX ORDER — HEPARIN SODIUM 5000 [USP'U]/ML
5000 INJECTION INTRAVENOUS; SUBCUTANEOUS EVERY 12 HOURS
Refills: 0 | Status: DISCONTINUED | OUTPATIENT
Start: 2019-09-02 | End: 2019-09-07

## 2019-09-02 RX ORDER — MAGNESIUM SULFATE 500 MG/ML
1 VIAL (ML) INJECTION
Qty: 40 | Refills: 0 | Status: DISCONTINUED | OUTPATIENT
Start: 2019-09-02 | End: 2019-09-03

## 2019-09-02 RX ADMIN — Medication 60 MILLIGRAM(S): at 20:22

## 2019-09-02 RX ADMIN — PROGESTERONE 100 MILLIGRAM(S): 200 CAPSULE, LIQUID FILLED ORAL at 22:47

## 2019-09-02 RX ADMIN — SODIUM CHLORIDE 3 MILLILITER(S): 9 INJECTION INTRAMUSCULAR; INTRAVENOUS; SUBCUTANEOUS at 23:35

## 2019-09-02 RX ADMIN — Medication 37.5 GM/HR: at 22:46

## 2019-09-02 RX ADMIN — Medication 20 MILLIGRAM(S): at 18:35

## 2019-09-02 RX ADMIN — HEPARIN SODIUM 5000 UNIT(S): 5000 INJECTION INTRAVENOUS; SUBCUTANEOUS at 06:03

## 2019-09-02 RX ADMIN — Medication 50 GM/HR: at 19:56

## 2019-09-02 RX ADMIN — Medication 200 GRAM(S): at 19:25

## 2019-09-02 RX ADMIN — SODIUM CHLORIDE 3 MILLILITER(S): 9 INJECTION INTRAMUSCULAR; INTRAVENOUS; SUBCUTANEOUS at 11:53

## 2019-09-02 RX ADMIN — SODIUM CHLORIDE 3 MILLILITER(S): 9 INJECTION INTRAMUSCULAR; INTRAVENOUS; SUBCUTANEOUS at 05:59

## 2019-09-02 RX ADMIN — Medication 1 TABLET(S): at 11:53

## 2019-09-02 RX ADMIN — SODIUM CHLORIDE 50 MILLILITER(S): 9 INJECTION, SOLUTION INTRAVENOUS at 19:50

## 2019-09-02 RX ADMIN — Medication 200 MILLIGRAM(S): at 19:31

## 2019-09-02 RX ADMIN — Medication 10 MILLIGRAM(S): at 19:20

## 2019-09-02 RX ADMIN — Medication 40 MILLIGRAM(S): at 18:55

## 2019-09-02 RX ADMIN — Medication 50 GM/HR: at 19:44

## 2019-09-02 NOTE — PROGRESS NOTE ADULT - SUBJECTIVE AND OBJECTIVE BOX
Patient seen and examined at bedside, no acute overnight events. No acute complaints. Pt reports good fetal movement.  Denies leakage of fluid, contractions, vaginal bleeding.   Denies HA, epigastric pain, blurred vision, CP, SOB, N/V, fevers, and chills.    Vital Signs Last 24 Hours  T(C): 36.8 (09-02-19 @ 11:00), Max: 36.8 (09-01-19 @ 21:04)  HR: 91 (09-02-19 @ 11:17) (84 - 119)  BP: 155/90 (09-02-19 @ 11:00) (129/75 - 161/94)  RR: 18 (09-02-19 @ 11:00) (17 - 18)  SpO2: 100% (09-02-19 @ 11:17) (98% - 100%)            Physical Exam:  General: NAD  Abdomen: Soft, non-tender, gravid  Ext: 1 LE+edema    Labs:             10.1   6.77  )-----------( 202      ( 08-31 @ 12:39 )             33.1     08-31 @ 12:39    138  |  106  |  10  ----------------------------<  75  4.4   |  20  |  0.87    Ca    9.1      08-31 @ 12:39  Mg     5.8     09-01 @ 06:50    TPro  6.6  /  Alb  3.5  /  TBili  0.4  /  DBili  x   /  AST  31  /  ALT  21  /  AlkPhos  139  08-31 @ 12:39    PT/INR - ( 08-31 @ 12:39 )   PT: 10.9 SEC;   INR: 0.96     PTT - ( 08-31 @ 12:39 )  PTT:26.0 SEC    Uric Acid: (08-31 @ 12:39)  4.6      Fibrinogen: (08-31 @ 12:39)  576.0    LDH: (08-31 @ 12:39)  350        MEDICATIONS  (STANDING):  heparin  Injectable 5000 Unit(s) SubCutaneous every 12 hours  lactated ringers. 1000 milliLiter(s) (62.5 mL/Hr) IV Continuous <Continuous>  NIFEdipine XL 30 milliGRAM(s) Oral daily  ondansetron Injectable 4 milliGRAM(s) IV Push once  oxytocin Infusion 333.333 milliUNIT(s)/Min (1000 mL/Hr) IV Continuous <Continuous>  prenatal multivitamin 1 Tablet(s) Oral daily  progesterone Vaginal Insert 100 milliGRAM(s) Vaginal at bedtime  sodium chloride 0.9% lock flush 3 milliLiter(s) IV Push every 8 hours    MEDICATIONS  (PRN):

## 2019-09-02 NOTE — CHART NOTE - NSCHARTNOTEFT_GEN_A_CORE
R3 OB Note     24 hour urine resulted as 3333mg. BPs noted to be 150s/80s with 2 BP in 160s/80s with repeat in 150s/80s. Patient remains asymptomatic denies HA, blurry vision, RUQ pain. Findings d/w Dr. Infante and Dr. Beasley, will increase procardia to 60 XL beginning with 8pm dose tonight. Will continue HSQ 5000 BID at this time.     EDWIGE Cervantes PGY3

## 2019-09-02 NOTE — CHART NOTE - NSCHARTNOTEFT_GEN_A_CORE
Pt seen at bedside due to severe range BPs. BPs were consistently elevated requiring IVP of labetalol 20, 40, hydralazine 10 and po labetalol 200 TID was started.  Pt was transferred to L&D for closer monitoring    Pt completely asymptomatic, resting in bed comfortably. denies headache, chest pain, shortness of breath, epigastric and RUQ pain     PE:  Gen: NAD  Abd: no RUQ/epigastric pain, non tender  Abd US: cephalic   LE: non tender BL    28 yo  31w a/w sPEC  - now with severe range BPs requiring multiple IV pushes  - transfer to L&D  - NPO  - continuous EFM  - hold HSQ  - spoke with MFM Fellow Dr Gil- plan is to stabilize BPs with medications at this time as it is not ideal to deliver at 31weeks. Will contact fellow again if BPs continue to be severe after the hydralazine IVP and po labetalol    TLal PGY3  dw Dr Infante

## 2019-09-02 NOTE — CHART NOTE - NSCHARTNOTEFT_GEN_A_CORE
Ob  note    went to evaluate pt for severe BP's.  Pt denies HA, visual changes, RUQ/epigastric pain, N/V.  No obstetric complaints reports good FM.   20mg IV labetalol being pushed will repeat BP 10 mins after.  Procardia dose increased to 60mg XL.  Will repeat preeclamptic labs at this time.  Pt also placed on monitor for NST at this time.  [facility] resident Dr. Roberson updated.     Sherri Mcfadden MD

## 2019-09-02 NOTE — PROGRESS NOTE ADULT - PROBLEM SELECTOR PLAN 1
1. Maternal well being  - Neuro: s/p Mg for seizure ppx   - CV: hemodynamically stable, continue procardia 30mg XL for BP control   - Resp: saturating well  - GI: reg diet  - Heme: HSQ for DVT ppx  - OB: vaginal progesterone for cervical insufficiency  - FEN: LR@50      2. Fetal well being  - NST BID  - BPP Mon  - BMZ for fetal lung maturity  - PNV    TLal PGY3 1. Maternal well being  - Neuro: s/p Mg for seizure ppx   - CV: hemodynamically stable, continue procardia 30mg XL for BP control. BPs over this shift have been 129-156/75-91  - Resp: saturating well  - GI: reg diet  - Heme: HSQ for DVT ppx  - OB: vaginal progesterone for cervical insufficiency  - FEN: LR@62.5      2. Fetal well being  - NST BID  - BPP Mon  - BMZ for fetal lung maturity  - PNV    TLal PGY3

## 2019-09-02 NOTE — PROGRESS NOTE ADULT - ASSESSMENT
30 yo  30w6d a/w sPEC requiring hydralazine 5mg, 10mg IVP and labetalol 20mg IVP. Clinical condition improving with stabilization of BPs. Antepartum course complicated by cervical insufficiency with no acute changes upon admission.

## 2019-09-02 NOTE — PROGRESS NOTE ADULT - SUBJECTIVE AND OBJECTIVE BOX
R3 Antepartum Progress Note - HD#3    Subjective  Patient seen and examined at bedside, no acute overnight events. This AM, no acute complaints. Reports good FM and denies CTX, LOF, VB  Denies headache, chest pain, shortness of breath, epigastric and RUQ pain     Objective  Vital Signs Last 24 Hours  T(C): 36.5 (09-01-19 @ 01:29), Max: 36.9 (08-31-19 @ 12:03)  HR: 88 (09-01-19 @ 03:09) (64 - 126)  BP: 142/95 (09-01-19 @ 03:08) (130/79 - 192/113)  RR: 19 (08-31-19 @ 12:53) (16 - 19)  SpO2: 98% (09-01-19 @ 03:09) (96% - 100%)    Physical Exam:  General: NAD  CV: RRR, no murmurs, S1, S2  Resp: CTA-B   Abdomen: Soft, non-tender, non-distended, gravid  Ext: no edema/tenderness in LE b/l       Labs:    Blood Type: A Positive  Antibody Screen: --               10.1   6.77  )-----------( 202      ( 08-31 @ 12:39 )             33.1     08-31-19 @ 12:39    138  |  106  |  10             --------------------------< 75     4.4  |  20<L>  | 0.87    eGFR AA: 104  eGFR N-AA: 89    Calcium: 9.1  Phosphorus: --  Magnesium: --    AST: 31    ALT: 21  AlkPhos: 139<H>  Protein: 6.6  Albumin: 3.5  TBili: 0.4  D-Bili: --      MEDICATIONS  (STANDING):  betamethasone Injectable 12 milliGRAM(s) IntraMuscular every 24 hours  lactated ringers. 1000 milliLiter(s) (50 mL/Hr) IV Continuous <Continuous>  magnesium sulfate Infusion 2 Gm/Hr (50 mL/Hr) IV Continuous <Continuous>  NIFEdipine XL 30 milliGRAM(s) Oral daily  ondansetron Injectable 4 milliGRAM(s) IV Push once  oxytocin Infusion 333.333 milliUNIT(s)/Min (1000 mL/Hr) IV Continuous <Continuous>  progesterone Vaginal Insert 100 milliGRAM(s) Vaginal at bedtime    MEDICATIONS  (PRN):

## 2019-09-03 ENCOUNTER — ASOB RESULT (OUTPATIENT)
Age: 29
End: 2019-09-03

## 2019-09-03 ENCOUNTER — APPOINTMENT (OUTPATIENT)
Dept: ANTEPARTUM | Facility: HOSPITAL | Age: 29
End: 2019-09-03
Payer: MEDICAID

## 2019-09-03 PROBLEM — Z78.9 OTHER SPECIFIED HEALTH STATUS: Chronic | Status: ACTIVE | Noted: 2019-08-31

## 2019-09-03 LAB
ALBUMIN SERPL ELPH-MCNC: 3.1 G/DL — LOW (ref 3.3–5)
ALBUMIN SERPL ELPH-MCNC: 3.2 G/DL — LOW (ref 3.3–5)
ALP SERPL-CCNC: 119 U/L — SIGNIFICANT CHANGE UP (ref 40–120)
ALP SERPL-CCNC: 126 U/L — HIGH (ref 40–120)
ALT FLD-CCNC: 21 U/L — SIGNIFICANT CHANGE UP (ref 4–33)
ALT FLD-CCNC: 21 U/L — SIGNIFICANT CHANGE UP (ref 4–33)
ANION GAP SERPL CALC-SCNC: 12 MMO/L — SIGNIFICANT CHANGE UP (ref 7–14)
ANION GAP SERPL CALC-SCNC: 15 MMO/L — HIGH (ref 7–14)
APTT BLD: 23.3 SEC — LOW (ref 27.5–36.3)
APTT BLD: 24.2 SEC — LOW (ref 27.5–36.3)
AST SERPL-CCNC: 30 U/L — SIGNIFICANT CHANGE UP (ref 4–32)
AST SERPL-CCNC: 31 U/L — SIGNIFICANT CHANGE UP (ref 4–32)
BASOPHILS # BLD AUTO: 0.01 K/UL — SIGNIFICANT CHANGE UP (ref 0–0.2)
BASOPHILS # BLD AUTO: 0.01 K/UL — SIGNIFICANT CHANGE UP (ref 0–0.2)
BASOPHILS NFR BLD AUTO: 0.1 % — SIGNIFICANT CHANGE UP (ref 0–2)
BASOPHILS NFR BLD AUTO: 0.1 % — SIGNIFICANT CHANGE UP (ref 0–2)
BILIRUB SERPL-MCNC: 0.3 MG/DL — SIGNIFICANT CHANGE UP (ref 0.2–1.2)
BILIRUB SERPL-MCNC: 0.3 MG/DL — SIGNIFICANT CHANGE UP (ref 0.2–1.2)
BUN SERPL-MCNC: 17 MG/DL — SIGNIFICANT CHANGE UP (ref 7–23)
BUN SERPL-MCNC: 18 MG/DL — SIGNIFICANT CHANGE UP (ref 7–23)
CALCIUM SERPL-MCNC: 7.2 MG/DL — LOW (ref 8.4–10.5)
CALCIUM SERPL-MCNC: 7.4 MG/DL — LOW (ref 8.4–10.5)
CHLORIDE SERPL-SCNC: 102 MMOL/L — SIGNIFICANT CHANGE UP (ref 98–107)
CHLORIDE SERPL-SCNC: 103 MMOL/L — SIGNIFICANT CHANGE UP (ref 98–107)
CO2 SERPL-SCNC: 18 MMOL/L — LOW (ref 22–31)
CO2 SERPL-SCNC: 20 MMOL/L — LOW (ref 22–31)
CREAT SERPL-MCNC: 0.87 MG/DL — SIGNIFICANT CHANGE UP (ref 0.5–1.3)
CREAT SERPL-MCNC: 0.88 MG/DL — SIGNIFICANT CHANGE UP (ref 0.5–1.3)
EOSINOPHIL # BLD AUTO: 0 K/UL — SIGNIFICANT CHANGE UP (ref 0–0.5)
EOSINOPHIL # BLD AUTO: 0 K/UL — SIGNIFICANT CHANGE UP (ref 0–0.5)
EOSINOPHIL NFR BLD AUTO: 0 % — SIGNIFICANT CHANGE UP (ref 0–6)
EOSINOPHIL NFR BLD AUTO: 0 % — SIGNIFICANT CHANGE UP (ref 0–6)
FIBRINOGEN PPP-MCNC: 397 MG/DL — SIGNIFICANT CHANGE UP (ref 350–510)
FIBRINOGEN PPP-MCNC: 431.9 MG/DL — SIGNIFICANT CHANGE UP (ref 350–510)
GLUCOSE SERPL-MCNC: 120 MG/DL — HIGH (ref 70–99)
GLUCOSE SERPL-MCNC: 93 MG/DL — SIGNIFICANT CHANGE UP (ref 70–99)
GP B STREP GENITAL QL CULT: SIGNIFICANT CHANGE UP
HCT VFR BLD CALC: 28.6 % — LOW (ref 34.5–45)
HCT VFR BLD CALC: 29.4 % — LOW (ref 34.5–45)
HGB BLD-MCNC: 8.7 G/DL — LOW (ref 11.5–15.5)
HGB BLD-MCNC: 9 G/DL — LOW (ref 11.5–15.5)
IMM GRANULOCYTES NFR BLD AUTO: 2.3 % — HIGH (ref 0–1.5)
IMM GRANULOCYTES NFR BLD AUTO: 2.4 % — HIGH (ref 0–1.5)
INR BLD: 0.93 — SIGNIFICANT CHANGE UP (ref 0.88–1.17)
INR BLD: 0.96 — SIGNIFICANT CHANGE UP (ref 0.88–1.17)
LDH SERPL L TO P-CCNC: 303 U/L — HIGH (ref 135–225)
LDH SERPL L TO P-CCNC: 331 U/L — HIGH (ref 135–225)
LYMPHOCYTES # BLD AUTO: 1.15 K/UL — SIGNIFICANT CHANGE UP (ref 1–3.3)
LYMPHOCYTES # BLD AUTO: 1.28 K/UL — SIGNIFICANT CHANGE UP (ref 1–3.3)
LYMPHOCYTES # BLD AUTO: 15.3 % — SIGNIFICANT CHANGE UP (ref 13–44)
LYMPHOCYTES # BLD AUTO: 16.2 % — SIGNIFICANT CHANGE UP (ref 13–44)
MAGNESIUM SERPL-MCNC: 6.2 MG/DL — HIGH (ref 1.6–2.6)
MAGNESIUM SERPL-MCNC: 6.6 MG/DL — HIGH (ref 1.6–2.6)
MCHC RBC-ENTMCNC: 25.2 PG — LOW (ref 27–34)
MCHC RBC-ENTMCNC: 25.4 PG — LOW (ref 27–34)
MCHC RBC-ENTMCNC: 30.4 % — LOW (ref 32–36)
MCHC RBC-ENTMCNC: 30.6 % — LOW (ref 32–36)
MCV RBC AUTO: 82.4 FL — SIGNIFICANT CHANGE UP (ref 80–100)
MCV RBC AUTO: 83.4 FL — SIGNIFICANT CHANGE UP (ref 80–100)
MONOCYTES # BLD AUTO: 0.6 K/UL — SIGNIFICANT CHANGE UP (ref 0–0.9)
MONOCYTES # BLD AUTO: 0.72 K/UL — SIGNIFICANT CHANGE UP (ref 0–0.9)
MONOCYTES NFR BLD AUTO: 8.4 % — SIGNIFICANT CHANGE UP (ref 2–14)
MONOCYTES NFR BLD AUTO: 8.6 % — SIGNIFICANT CHANGE UP (ref 2–14)
NEUTROPHILS # BLD AUTO: 5.19 K/UL — SIGNIFICANT CHANGE UP (ref 1.8–7.4)
NEUTROPHILS # BLD AUTO: 6.19 K/UL — SIGNIFICANT CHANGE UP (ref 1.8–7.4)
NEUTROPHILS NFR BLD AUTO: 72.9 % — SIGNIFICANT CHANGE UP (ref 43–77)
NEUTROPHILS NFR BLD AUTO: 73.7 % — SIGNIFICANT CHANGE UP (ref 43–77)
NRBC # FLD: 0.02 K/UL — SIGNIFICANT CHANGE UP (ref 0–0)
NRBC # FLD: 0.04 K/UL — SIGNIFICANT CHANGE UP (ref 0–0)
PLATELET # BLD AUTO: 251 K/UL — SIGNIFICANT CHANGE UP (ref 150–400)
PLATELET # BLD AUTO: 255 K/UL — SIGNIFICANT CHANGE UP (ref 150–400)
PMV BLD: 11.2 FL — SIGNIFICANT CHANGE UP (ref 7–13)
PMV BLD: 11.6 FL — SIGNIFICANT CHANGE UP (ref 7–13)
POTASSIUM SERPL-MCNC: 4.3 MMOL/L — SIGNIFICANT CHANGE UP (ref 3.5–5.3)
POTASSIUM SERPL-MCNC: 4.5 MMOL/L — SIGNIFICANT CHANGE UP (ref 3.5–5.3)
POTASSIUM SERPL-SCNC: 4.3 MMOL/L — SIGNIFICANT CHANGE UP (ref 3.5–5.3)
POTASSIUM SERPL-SCNC: 4.5 MMOL/L — SIGNIFICANT CHANGE UP (ref 3.5–5.3)
PROT SERPL-MCNC: 5.7 G/DL — LOW (ref 6–8.3)
PROT SERPL-MCNC: 6 G/DL — SIGNIFICANT CHANGE UP (ref 6–8.3)
PROTHROM AB SERPL-ACNC: 10.6 SEC — SIGNIFICANT CHANGE UP (ref 9.8–13.1)
PROTHROM AB SERPL-ACNC: 10.6 SEC — SIGNIFICANT CHANGE UP (ref 9.8–13.1)
RBC # BLD: 3.43 M/UL — LOW (ref 3.8–5.2)
RBC # BLD: 3.57 M/UL — LOW (ref 3.8–5.2)
RBC # FLD: 16.7 % — HIGH (ref 10.3–14.5)
RBC # FLD: 17 % — HIGH (ref 10.3–14.5)
SODIUM SERPL-SCNC: 134 MMOL/L — LOW (ref 135–145)
SODIUM SERPL-SCNC: 136 MMOL/L — SIGNIFICANT CHANGE UP (ref 135–145)
URATE SERPL-MCNC: 6 MG/DL — SIGNIFICANT CHANGE UP (ref 2.5–7)
URATE SERPL-MCNC: 6.3 MG/DL — SIGNIFICANT CHANGE UP (ref 2.5–7)
WBC # BLD: 7.12 K/UL — SIGNIFICANT CHANGE UP (ref 3.8–10.5)
WBC # BLD: 8.39 K/UL — SIGNIFICANT CHANGE UP (ref 3.8–10.5)
WBC # FLD AUTO: 7.12 K/UL — SIGNIFICANT CHANGE UP (ref 3.8–10.5)
WBC # FLD AUTO: 8.39 K/UL — SIGNIFICANT CHANGE UP (ref 3.8–10.5)

## 2019-09-03 PROCEDURE — 76811 OB US DETAILED SNGL FETUS: CPT | Mod: 26

## 2019-09-03 PROCEDURE — 76820 UMBILICAL ARTERY ECHO: CPT | Mod: 26

## 2019-09-03 PROCEDURE — 76819 FETAL BIOPHYS PROFIL W/O NST: CPT | Mod: 26

## 2019-09-03 RX ADMIN — Medication 200 MILLIGRAM(S): at 03:30

## 2019-09-03 RX ADMIN — Medication 25 GM/HR: at 07:12

## 2019-09-03 RX ADMIN — Medication 200 MILLIGRAM(S): at 20:21

## 2019-09-03 RX ADMIN — Medication 200 MILLIGRAM(S): at 11:55

## 2019-09-03 RX ADMIN — Medication 25 GM/HR: at 04:25

## 2019-09-03 RX ADMIN — SODIUM CHLORIDE 3 MILLILITER(S): 9 INJECTION INTRAMUSCULAR; INTRAVENOUS; SUBCUTANEOUS at 22:19

## 2019-09-03 RX ADMIN — SODIUM CHLORIDE 75 MILLILITER(S): 9 INJECTION, SOLUTION INTRAVENOUS at 04:26

## 2019-09-03 RX ADMIN — PROGESTERONE 100 MILLIGRAM(S): 200 CAPSULE, LIQUID FILLED ORAL at 22:14

## 2019-09-03 RX ADMIN — SODIUM CHLORIDE 3 MILLILITER(S): 9 INJECTION INTRAMUSCULAR; INTRAVENOUS; SUBCUTANEOUS at 12:31

## 2019-09-03 RX ADMIN — Medication 60 MILLIGRAM(S): at 22:14

## 2019-09-03 RX ADMIN — Medication 1 TABLET(S): at 13:33

## 2019-09-03 RX ADMIN — HEPARIN SODIUM 5000 UNIT(S): 5000 INJECTION INTRAVENOUS; SUBCUTANEOUS at 18:22

## 2019-09-03 RX ADMIN — SODIUM CHLORIDE 75 MILLILITER(S): 9 INJECTION, SOLUTION INTRAVENOUS at 07:13

## 2019-09-03 RX ADMIN — SODIUM CHLORIDE 3 MILLILITER(S): 9 INJECTION INTRAMUSCULAR; INTRAVENOUS; SUBCUTANEOUS at 06:13

## 2019-09-03 NOTE — PROGRESS NOTE ADULT - ASSESSMENT
30 yo  31w1d  a/w sPEC. Antepartum course complicated by cervical insufficiency with no acute changes upon admission. Now on L&D due to severe range BPs.

## 2019-09-03 NOTE — CHART NOTE - NSCHARTNOTEFT_GEN_A_CORE
MFM Note  Pat at 31wka with preeclampsia (BP's in the severe range)  Reponded to increase dose of Ca blocker (60mg) and Labetalol 200mg TID  Currently BP;s in the 140's range  FHR reassuring  Plan: Continue conserv management          If BP's go up again consider delivery

## 2019-09-03 NOTE — PROGRESS NOTE ADULT - SUBJECTIVE AND OBJECTIVE BOX
R3 Antepartum Progress Note - HD#4    Interval events: over the course of the shift, pt had multiple severe range BPs and was transferred to L&D for closer monitoring. IVP labetalol 20, 40, hydralazine 10 and po labetalol 200 TID was started. BPs since that time were 130-140/70s    Subjective  Patient seen and examined at bedside. This AM, no acute complaints. Reports good FM and denies CTX, LOF, VB  Denies headache, chest pain, shortness of breath, epigastric and RUQ pain     Objective  Vital Signs Last 24 Hours  T(C): 36.5 (09-01-19 @ 01:29), Max: 36.9 (08-31-19 @ 12:03)  HR: 88 (09-01-19 @ 03:09) (64 - 126)  BP: 142/95 (09-01-19 @ 03:08) (130/79 - 192/113)  RR: 19 (08-31-19 @ 12:53) (16 - 19)  SpO2: 98% (09-01-19 @ 03:09) (96% - 100%)    Physical Exam:  General: NAD  CV: RRR, no murmurs, S1, S2  Resp: CTA-B   Abdomen: Soft, non-tender, non-distended, gravid  Ext: no edema/tenderness in LE b/l       EFM: baseline 125 mod scar +accel no decel  [place]: none     Labs:    Blood Type: A Positive                        8.6    9.00  )-----------( 235      ( 02 Sep 2019 19:15 )             27.6     09-02    138  |  107  |  17  ----------------------------<  94  4.5   |  20<L>  |  0.88    Ca    8.2<L>      02 Sep 2019 19:15  Mg     5.6     09-02    TPro  6.2  /  Alb  3.3  /  TBili  0.3  /  DBili  x   /  AST  28  /  ALT  23  /  AlkPhos  124<H>  09-02      MEDICATIONS  (STANDING):  betamethasone Injectable 12 milliGRAM(s) IntraMuscular every 24 hours  lactated ringers. 1000 milliLiter(s) (50 mL/Hr) IV Continuous <Continuous>  magnesium sulfate Infusion 2 Gm/Hr (50 mL/Hr) IV Continuous <Continuous>  NIFEdipine XL 30 milliGRAM(s) Oral daily  ondansetron Injectable 4 milliGRAM(s) IV Push once  oxytocin Infusion 333.333 milliUNIT(s)/Min (1000 mL/Hr) IV Continuous <Continuous>  progesterone Vaginal Insert 100 milliGRAM(s) Vaginal at bedtime    MEDICATIONS  (PRN):

## 2019-09-03 NOTE — PROGRESS NOTE ADULT - PROBLEM SELECTOR PLAN 1
1. Maternal well being  - Neuro: MgSO4 for seizure ppx   - CV: hemodynamically stable, continue procardia 60mg XL and labetalol 200 TID for BP control.   - Resp: saturating well  - GI: NPO  - Heme: holding HSQ for DVT ppx  - OB: vaginal progesterone for cervical insufficiency      2. Fetal well being  - continuous EFM/toco  - BPP Mon  - s/p BMZ for fetal lung maturity  - PNV    TLal PGY3

## 2019-09-04 LAB
ALBUMIN SERPL ELPH-MCNC: 2.8 G/DL — LOW (ref 3.3–5)
ALP SERPL-CCNC: 109 U/L — SIGNIFICANT CHANGE UP (ref 40–120)
ALT FLD-CCNC: 23 U/L — SIGNIFICANT CHANGE UP (ref 4–33)
ANION GAP SERPL CALC-SCNC: 10 MMO/L — SIGNIFICANT CHANGE UP (ref 7–14)
APTT BLD: 29.2 SEC — SIGNIFICANT CHANGE UP (ref 27.5–36.3)
AST SERPL-CCNC: 27 U/L — SIGNIFICANT CHANGE UP (ref 4–32)
BASOPHILS # BLD AUTO: 0.01 K/UL — SIGNIFICANT CHANGE UP (ref 0–0.2)
BASOPHILS NFR BLD AUTO: 0.2 % — SIGNIFICANT CHANGE UP (ref 0–2)
BILIRUB SERPL-MCNC: 0.3 MG/DL — SIGNIFICANT CHANGE UP (ref 0.2–1.2)
BUN SERPL-MCNC: 18 MG/DL — SIGNIFICANT CHANGE UP (ref 7–23)
CALCIUM SERPL-MCNC: 8.1 MG/DL — LOW (ref 8.4–10.5)
CHLORIDE SERPL-SCNC: 106 MMOL/L — SIGNIFICANT CHANGE UP (ref 98–107)
CO2 SERPL-SCNC: 20 MMOL/L — LOW (ref 22–31)
CREAT SERPL-MCNC: 0.93 MG/DL — SIGNIFICANT CHANGE UP (ref 0.5–1.3)
EOSINOPHIL # BLD AUTO: 0.01 K/UL — SIGNIFICANT CHANGE UP (ref 0–0.5)
EOSINOPHIL NFR BLD AUTO: 0.2 % — SIGNIFICANT CHANGE UP (ref 0–6)
FIBRINOGEN PPP-MCNC: 408 MG/DL — SIGNIFICANT CHANGE UP (ref 350–510)
GLUCOSE SERPL-MCNC: 79 MG/DL — SIGNIFICANT CHANGE UP (ref 70–99)
HCT VFR BLD CALC: 26.9 % — LOW (ref 34.5–45)
HGB BLD-MCNC: 8 G/DL — LOW (ref 11.5–15.5)
IMM GRANULOCYTES NFR BLD AUTO: 3.2 % — HIGH (ref 0–1.5)
INR BLD: 0.96 — SIGNIFICANT CHANGE UP (ref 0.88–1.17)
LDH SERPL L TO P-CCNC: 292 U/L — HIGH (ref 135–225)
LYMPHOCYTES # BLD AUTO: 1.29 K/UL — SIGNIFICANT CHANGE UP (ref 1–3.3)
LYMPHOCYTES # BLD AUTO: 21.6 % — SIGNIFICANT CHANGE UP (ref 13–44)
MCHC RBC-ENTMCNC: 24.8 PG — LOW (ref 27–34)
MCHC RBC-ENTMCNC: 29.7 % — LOW (ref 32–36)
MCV RBC AUTO: 83.3 FL — SIGNIFICANT CHANGE UP (ref 80–100)
MONOCYTES # BLD AUTO: 0.64 K/UL — SIGNIFICANT CHANGE UP (ref 0–0.9)
MONOCYTES NFR BLD AUTO: 10.7 % — SIGNIFICANT CHANGE UP (ref 2–14)
NEUTROPHILS # BLD AUTO: 3.83 K/UL — SIGNIFICANT CHANGE UP (ref 1.8–7.4)
NEUTROPHILS NFR BLD AUTO: 64.1 % — SIGNIFICANT CHANGE UP (ref 43–77)
NRBC # FLD: 0.02 K/UL — SIGNIFICANT CHANGE UP (ref 0–0)
PLATELET # BLD AUTO: 203 K/UL — SIGNIFICANT CHANGE UP (ref 150–400)
PMV BLD: 11.4 FL — SIGNIFICANT CHANGE UP (ref 7–13)
POTASSIUM SERPL-MCNC: 4.5 MMOL/L — SIGNIFICANT CHANGE UP (ref 3.5–5.3)
POTASSIUM SERPL-SCNC: 4.5 MMOL/L — SIGNIFICANT CHANGE UP (ref 3.5–5.3)
PROT SERPL-MCNC: 5.3 G/DL — LOW (ref 6–8.3)
PROTHROM AB SERPL-ACNC: 10.9 SEC — SIGNIFICANT CHANGE UP (ref 9.8–13.1)
RBC # BLD: 3.23 M/UL — LOW (ref 3.8–5.2)
RBC # FLD: 16.5 % — HIGH (ref 10.3–14.5)
SODIUM SERPL-SCNC: 136 MMOL/L — SIGNIFICANT CHANGE UP (ref 135–145)
URATE SERPL-MCNC: 6.6 MG/DL — SIGNIFICANT CHANGE UP (ref 2.5–7)
WBC # BLD: 5.97 K/UL — SIGNIFICANT CHANGE UP (ref 3.8–10.5)
WBC # FLD AUTO: 5.97 K/UL — SIGNIFICANT CHANGE UP (ref 3.8–10.5)

## 2019-09-04 RX ADMIN — HEPARIN SODIUM 5000 UNIT(S): 5000 INJECTION INTRAVENOUS; SUBCUTANEOUS at 05:59

## 2019-09-04 RX ADMIN — PROGESTERONE 100 MILLIGRAM(S): 200 CAPSULE, LIQUID FILLED ORAL at 22:42

## 2019-09-04 RX ADMIN — Medication 200 MILLIGRAM(S): at 22:41

## 2019-09-04 RX ADMIN — Medication 200 MILLIGRAM(S): at 05:58

## 2019-09-04 RX ADMIN — HEPARIN SODIUM 5000 UNIT(S): 5000 INJECTION INTRAVENOUS; SUBCUTANEOUS at 18:08

## 2019-09-04 RX ADMIN — SODIUM CHLORIDE 3 MILLILITER(S): 9 INJECTION INTRAMUSCULAR; INTRAVENOUS; SUBCUTANEOUS at 22:43

## 2019-09-04 RX ADMIN — Medication 1 TABLET(S): at 14:51

## 2019-09-04 RX ADMIN — SODIUM CHLORIDE 3 MILLILITER(S): 9 INJECTION INTRAMUSCULAR; INTRAVENOUS; SUBCUTANEOUS at 06:02

## 2019-09-04 RX ADMIN — Medication 60 MILLIGRAM(S): at 23:41

## 2019-09-04 RX ADMIN — SODIUM CHLORIDE 3 MILLILITER(S): 9 INJECTION INTRAMUSCULAR; INTRAVENOUS; SUBCUTANEOUS at 14:00

## 2019-09-04 RX ADMIN — Medication 200 MILLIGRAM(S): at 14:51

## 2019-09-04 NOTE — PROGRESS NOTE ADULT - SUBJECTIVE AND OBJECTIVE BOX
Patient seen and examined at bedside, No acute complaints. Pt reports good fetal movement.  Denies leakage of fluid, contractions, vaginal bleeding.   Denies HA, epigastric pain, blurred vision, CP, SOB, N/V, fevers, and chills.    Vital Signs Last 24 Hours  T(C): 37 (09-04-19 @ 22:38), Max: 37.1 (09-04-19 @ 18:06)  HR: 86 (09-04-19 @ 22:38) (80 - 100)  BP: 145/82 (09-04-19 @ 22:38) (128/80 - 150/94)  RR: 18 (09-04-19 @ 22:38) (16 - 18)  SpO2: 99% (09-04-19 @ 22:38) (98% - 99%)  General: NAD  Abdomen: Soft, non-tender, gravid  Ext: No pain or swelling    Labs:             8.0    5.97  )-----------( 203      ( 09-04 @ 06:12 )             26.9     09-04 @ 06:12    136  |  106  |  18  ----------------------------<  79  4.5   |  20  |  0.93    Ca    8.1      09-04 @ 06:12  Mg     6.2     09-03 @ 10:00    TPro  5.3  /  Alb  2.8  /  TBili  0.3  /  DBili  x   /  AST  27  /  ALT  23  /  AlkPhos  109  09-04 @ 06:12    PT/INR - ( 09-04 @ 06:12 )   PT: 10.9 SEC;   INR: 0.96     PTT - ( 09-04 @ 06:12 )  PTT:29.2 SEC    Uric Acid: (09-04 @ 06:12)  6.6      Fibrinogen: (09-04 @ 06:12)  408.0    LDH: (09-04 @ 06:12)  292        MEDICATIONS  (STANDING):  heparin  Injectable 5000 Unit(s) SubCutaneous every 12 hours  labetalol 200 milliGRAM(s) Oral three times a day  NIFEdipine XL 60 milliGRAM(s) Oral daily  oxytocin Infusion 333.333 milliUNIT(s)/Min (1000 mL/Hr) IV Continuous <Continuous>  prenatal multivitamin 1 Tablet(s) Oral daily  progesterone Vaginal Insert 100 milliGRAM(s) Vaginal at bedtime  sodium chloride 0.9% lock flush 3 milliLiter(s) IV Push every 8 hours    MEDICATIONS  (PRN):

## 2019-09-04 NOTE — PROGRESS NOTE ADULT - ASSESSMENT
30 yo  31w2d a/w sPEC requiring multiple IV pushes of antihypertensives upon admission. Clinical condition improving with BP control on standing antihypertensives. Antepartum course complicated by new diagnosis of IUGR (7% on sono 9/3) and cervical insufficiency with no acute changes upon admission.

## 2019-09-04 NOTE — PROGRESS NOTE ADULT - SUBJECTIVE AND OBJECTIVE BOX
R3 Antepartum Progress Note - HD#5    Subjective  Patient seen and examined at bedside, no acute overnight events. This AM, no acute complaints. Reports good FM and denies CTX, LOF, VB. Denies HA, changes in vision, CP, SOB, palpitations, LE edema/tenderness.      Objective  Vital Signs Last 24 Hrs  T(C): 36.7 (04 Sep 2019 01:44), Max: 36.7 (03 Sep 2019 12:35)  T(F): 98 (04 Sep 2019 01:44), Max: 98 (03 Sep 2019 12:35)  HR: 84 (04 Sep 2019 05:58) (80 - 103)  BP: 128/80 (04 Sep 2019 05:58) (125/73 - 146/91)  BP(mean): --  RR: 16 (04 Sep 2019 05:58) (16 - 18)  SpO2: 98% (04 Sep 2019 05:58) (97% - 99%)    Physical Exam:  General: NAD  CV: RRR, no murmurs, S1, S2  Resp: CTA-B, symmetrical expansion  Abdomen: Soft, non-tender, non-distended, gravid  Ext: no edema/tenderness in LE b/l    Labs:    Blood Type: A Positive  Antibody Screen: --               8.0    5.97  )-----------( 203      ( 09-04 @ 06:12 )             26.9     09-04-19 @ 06:12    136  |  106  |  18             --------------------------< 79     4.5  |  20<L>  | 0.93    eGFR AA: 96  eGFR N-AA: 83    Calcium: 8.1<L>  Phosphorus: --  Magnesium: --    AST: 27    ALT: 23  AlkPhos: 109  Protein: 5.3<L>  Albumin: 2.8<L>  TBili: 0.3  D-Bili: --    MEDICATIONS  (STANDING):  heparin  Injectable 5000 Unit(s) SubCutaneous every 12 hours  labetalol 200 milliGRAM(s) Oral three times a day  NIFEdipine XL 60 milliGRAM(s) Oral daily  oxytocin Infusion 333.333 milliUNIT(s)/Min (1000 mL/Hr) IV Continuous <Continuous>  prenatal multivitamin 1 Tablet(s) Oral daily  progesterone Vaginal Insert 100 milliGRAM(s) Vaginal at bedtime  sodium chloride 0.9% lock flush 3 milliLiter(s) IV Push every 8 hours

## 2019-09-04 NOTE — PROGRESS NOTE ADULT - PROBLEM SELECTOR PLAN 1
1. Maternal well being  - CV: hemodynamically stable, continue procardia 30mg XL and labetalol 200mg TID for BP control   - Resp: saturating well  - GI: reg diet  - Heme: HSQ for DVT ppx  - OB: vaginal progesterone for cervical insufficiency  - FEN: SLIV  - Neuro: s/p Mg for seizure prophylaxis. Will restart magnesium if BPs become severe range    2. Fetal well being  - NST BID  - BPP Mon  - s/p BMZ for fetal lung maturity  - PNV    AMERICA Mcdonough pgy3

## 2019-09-05 RX ADMIN — Medication 60 MILLIGRAM(S): at 18:27

## 2019-09-05 RX ADMIN — Medication 200 MILLIGRAM(S): at 06:08

## 2019-09-05 RX ADMIN — HEPARIN SODIUM 5000 UNIT(S): 5000 INJECTION INTRAVENOUS; SUBCUTANEOUS at 18:28

## 2019-09-05 RX ADMIN — HEPARIN SODIUM 5000 UNIT(S): 5000 INJECTION INTRAVENOUS; SUBCUTANEOUS at 06:08

## 2019-09-05 RX ADMIN — SODIUM CHLORIDE 3 MILLILITER(S): 9 INJECTION INTRAMUSCULAR; INTRAVENOUS; SUBCUTANEOUS at 23:05

## 2019-09-05 RX ADMIN — SODIUM CHLORIDE 3 MILLILITER(S): 9 INJECTION INTRAMUSCULAR; INTRAVENOUS; SUBCUTANEOUS at 15:00

## 2019-09-05 RX ADMIN — PROGESTERONE 100 MILLIGRAM(S): 200 CAPSULE, LIQUID FILLED ORAL at 23:05

## 2019-09-05 RX ADMIN — Medication 200 MILLIGRAM(S): at 15:12

## 2019-09-05 RX ADMIN — SODIUM CHLORIDE 3 MILLILITER(S): 9 INJECTION INTRAMUSCULAR; INTRAVENOUS; SUBCUTANEOUS at 06:12

## 2019-09-05 RX ADMIN — Medication 200 MILLIGRAM(S): at 23:05

## 2019-09-05 RX ADMIN — Medication 1 TABLET(S): at 12:09

## 2019-09-05 NOTE — PROGRESS NOTE ADULT - PROBLEM SELECTOR PLAN 1
1. Maternal well being  - CV: hemodynamically stable, continue procardia 30mg XL and labetalol 200mg TID for BP control   - Resp: saturating well  - GI: reg diet  - Heme: HSQ for DVT ppx  - OB: vaginal progesterone for cervical insufficiency  - FEN: SLIV  - Neuro: s/p Mg for seizure prophylaxis. Will restart magnesium if BPs become severe range    2. Fetal well being  - NST BID  - BPP Tues/Fri  - s/p BMZ for fetal lung maturity  - PNV    AMERICA Mcdonough pgy3

## 2019-09-05 NOTE — PROGRESS NOTE ADULT - SUBJECTIVE AND OBJECTIVE BOX
R3 Antepartum Progress Note - HD#6    Subjective  Patient seen and examined at bedside, no acute overnight events. This AM, no acute complaints. Reports good FM and denies CTX, LOF, VB. Denies HA, changes in vision, CP, SOB, palpitations, LE edema/tenderness.      Objective  Vital Signs Last 24 Hrs  T(C): 36.8 (05 Sep 2019 06:05), Max: 37.1 (04 Sep 2019 18:06)  T(F): 98.3 (05 Sep 2019 06:05), Max: 98.7 (04 Sep 2019 18:06)  HR: 83 (05 Sep 2019 06:05) (80 - 100)  BP: 149/91 (05 Sep 2019 06:05) (129/80 - 150/94)  BP(mean): --  RR: 16 (05 Sep 2019 06:05) (16 - 18)  SpO2: 99% (05 Sep 2019 06:05) (98% - 99%)    Physical Exam:  General: NAD  CV: RRR, no murmurs, S1, S2  Resp: CTA-B, symmetrical expansion  Abdomen: Soft, non-tender, non-distended, gravid  Ext: no edema/tenderness in LE b/l    Labs:    Blood Type: A Positive  Antibody Screen: --               8.0    5.97  )-----------( 203      ( 09-04 @ 06:12 )             26.9     09-04-19 @ 06:12    136  |  106  |  18             --------------------------< 79     4.5  |  20<L>  | 0.93    eGFR AA: 96  eGFR N-AA: 83    Calcium: 8.1<L>  Phosphorus: --  Magnesium: --    AST: 27    ALT: 23  AlkPhos: 109  Protein: 5.3<L>  Albumin: 2.8<L>  TBili: 0.3  D-Bili: --    MEDICATIONS  (STANDING):  heparin  Injectable 5000 Unit(s) SubCutaneous every 12 hours  labetalol 200 milliGRAM(s) Oral three times a day  NIFEdipine XL 60 milliGRAM(s) Oral daily  oxytocin Infusion 333.333 milliUNIT(s)/Min (1000 mL/Hr) IV Continuous <Continuous>  prenatal multivitamin 1 Tablet(s) Oral daily  progesterone Vaginal Insert 100 milliGRAM(s) Vaginal at bedtime  sodium chloride 0.9% lock flush 3 milliLiter(s) IV Push every 8 hours

## 2019-09-05 NOTE — PROGRESS NOTE ADULT - ASSESSMENT
30 yo  31w3d a/w sPEC requiring multiple IV pushes of antihypertensives upon admission. Clinical condition improving with BP control on standing antihypertensives. Antepartum course complicated by new diagnosis of IUGR (7% on sono 9/3) and cervical insufficiency with no acute changes upon admission.

## 2019-09-06 ENCOUNTER — OUTPATIENT (OUTPATIENT)
Dept: OUTPATIENT SERVICES | Facility: HOSPITAL | Age: 29
LOS: 1 days | End: 2019-09-06

## 2019-09-06 ENCOUNTER — TRANSCRIPTION ENCOUNTER (OUTPATIENT)
Age: 29
End: 2019-09-06

## 2019-09-06 ENCOUNTER — APPOINTMENT (OUTPATIENT)
Dept: ANTEPARTUM | Facility: HOSPITAL | Age: 29
End: 2019-09-06
Payer: MEDICAID

## 2019-09-06 ENCOUNTER — ASOB RESULT (OUTPATIENT)
Age: 29
End: 2019-09-06

## 2019-09-06 DIAGNOSIS — Z3A.31 31 WEEKS GESTATION OF PREGNANCY: ICD-10-CM

## 2019-09-06 LAB
ALBUMIN SERPL ELPH-MCNC: 2.9 G/DL — LOW (ref 3.3–5)
ALP SERPL-CCNC: 122 U/L — HIGH (ref 40–120)
ALT FLD-CCNC: 27 U/L — SIGNIFICANT CHANGE UP (ref 4–33)
ANION GAP SERPL CALC-SCNC: 10 MMO/L — SIGNIFICANT CHANGE UP (ref 7–14)
APTT BLD: 31.7 SEC — SIGNIFICANT CHANGE UP (ref 27.5–36.3)
AST SERPL-CCNC: 35 U/L — HIGH (ref 4–32)
BASOPHILS # BLD AUTO: 0.02 K/UL — SIGNIFICANT CHANGE UP (ref 0–0.2)
BASOPHILS NFR BLD AUTO: 0.4 % — SIGNIFICANT CHANGE UP (ref 0–2)
BILIRUB SERPL-MCNC: 0.4 MG/DL — SIGNIFICANT CHANGE UP (ref 0.2–1.2)
BLD GP AB SCN SERPL QL: NEGATIVE — SIGNIFICANT CHANGE UP
BUN SERPL-MCNC: 22 MG/DL — SIGNIFICANT CHANGE UP (ref 7–23)
CALCIUM SERPL-MCNC: 8.7 MG/DL — SIGNIFICANT CHANGE UP (ref 8.4–10.5)
CHLORIDE SERPL-SCNC: 106 MMOL/L — SIGNIFICANT CHANGE UP (ref 98–107)
CO2 SERPL-SCNC: 19 MMOL/L — LOW (ref 22–31)
CREAT SERPL-MCNC: 1.01 MG/DL — SIGNIFICANT CHANGE UP (ref 0.5–1.3)
EOSINOPHIL # BLD AUTO: 0.02 K/UL — SIGNIFICANT CHANGE UP (ref 0–0.5)
EOSINOPHIL NFR BLD AUTO: 0.4 % — SIGNIFICANT CHANGE UP (ref 0–6)
FIBRINOGEN PPP-MCNC: 457 MG/DL — SIGNIFICANT CHANGE UP (ref 350–510)
GLUCOSE SERPL-MCNC: 80 MG/DL — SIGNIFICANT CHANGE UP (ref 70–99)
HCT VFR BLD CALC: 27.7 % — LOW (ref 34.5–45)
HGB BLD-MCNC: 8.7 G/DL — LOW (ref 11.5–15.5)
IMM GRANULOCYTES NFR BLD AUTO: 1.6 % — HIGH (ref 0–1.5)
INR BLD: 0.97 — SIGNIFICANT CHANGE UP (ref 0.88–1.17)
LDH SERPL L TO P-CCNC: 313 U/L — HIGH (ref 135–225)
LYMPHOCYTES # BLD AUTO: 1.36 K/UL — SIGNIFICANT CHANGE UP (ref 1–3.3)
LYMPHOCYTES # BLD AUTO: 24.6 % — SIGNIFICANT CHANGE UP (ref 13–44)
MCHC RBC-ENTMCNC: 25.4 PG — LOW (ref 27–34)
MCHC RBC-ENTMCNC: 31.4 % — LOW (ref 32–36)
MCV RBC AUTO: 81 FL — SIGNIFICANT CHANGE UP (ref 80–100)
MONOCYTES # BLD AUTO: 0.62 K/UL — SIGNIFICANT CHANGE UP (ref 0–0.9)
MONOCYTES NFR BLD AUTO: 11.2 % — SIGNIFICANT CHANGE UP (ref 2–14)
NEUTROPHILS # BLD AUTO: 3.42 K/UL — SIGNIFICANT CHANGE UP (ref 1.8–7.4)
NEUTROPHILS NFR BLD AUTO: 61.8 % — SIGNIFICANT CHANGE UP (ref 43–77)
NRBC # FLD: 0 K/UL — SIGNIFICANT CHANGE UP (ref 0–0)
PLATELET # BLD AUTO: 219 K/UL — SIGNIFICANT CHANGE UP (ref 150–400)
PMV BLD: 11.4 FL — SIGNIFICANT CHANGE UP (ref 7–13)
POTASSIUM SERPL-MCNC: 5.9 MMOL/L — HIGH (ref 3.5–5.3)
POTASSIUM SERPL-SCNC: 5.9 MMOL/L — HIGH (ref 3.5–5.3)
PROT SERPL-MCNC: 5.6 G/DL — LOW (ref 6–8.3)
PROTHROM AB SERPL-ACNC: 11.1 SEC — SIGNIFICANT CHANGE UP (ref 9.8–13.1)
RBC # BLD: 3.42 M/UL — LOW (ref 3.8–5.2)
RBC # FLD: 16 % — HIGH (ref 10.3–14.5)
RH IG SCN BLD-IMP: POSITIVE — SIGNIFICANT CHANGE UP
SODIUM SERPL-SCNC: 135 MMOL/L — SIGNIFICANT CHANGE UP (ref 135–145)
URATE SERPL-MCNC: 6.9 MG/DL — SIGNIFICANT CHANGE UP (ref 2.5–7)
WBC # BLD: 5.53 K/UL — SIGNIFICANT CHANGE UP (ref 3.8–10.5)
WBC # FLD AUTO: 5.53 K/UL — SIGNIFICANT CHANGE UP (ref 3.8–10.5)

## 2019-09-06 PROCEDURE — 99233 SBSQ HOSP IP/OBS HIGH 50: CPT | Mod: 25

## 2019-09-06 PROCEDURE — 76820 UMBILICAL ARTERY ECHO: CPT | Mod: 26

## 2019-09-06 PROCEDURE — 76819 FETAL BIOPHYS PROFIL W/O NST: CPT | Mod: 26

## 2019-09-06 RX ORDER — LABETALOL HCL 100 MG
200 TABLET ORAL ONCE
Refills: 0 | Status: COMPLETED | OUTPATIENT
Start: 2019-09-06 | End: 2019-09-06

## 2019-09-06 RX ORDER — LABETALOL HCL 100 MG
40 TABLET ORAL ONCE
Refills: 0 | Status: COMPLETED | OUTPATIENT
Start: 2019-09-06 | End: 2019-09-06

## 2019-09-06 RX ORDER — MAGNESIUM SULFATE 500 MG/ML
4 VIAL (ML) INJECTION ONCE
Refills: 0 | Status: COMPLETED | OUTPATIENT
Start: 2019-09-06 | End: 2019-09-06

## 2019-09-06 RX ORDER — LABETALOL HCL 100 MG
20 TABLET ORAL ONCE
Refills: 0 | Status: COMPLETED | OUTPATIENT
Start: 2019-09-06 | End: 2019-09-06

## 2019-09-06 RX ORDER — HYDRALAZINE HCL 50 MG
10 TABLET ORAL ONCE
Refills: 0 | Status: COMPLETED | OUTPATIENT
Start: 2019-09-06 | End: 2019-09-06

## 2019-09-06 RX ORDER — LABETALOL HCL 100 MG
400 TABLET ORAL THREE TIMES A DAY
Refills: 0 | Status: DISCONTINUED | OUTPATIENT
Start: 2019-09-06 | End: 2019-09-07

## 2019-09-06 RX ORDER — MAGNESIUM SULFATE 500 MG/ML
1.5 VIAL (ML) INJECTION
Qty: 40 | Refills: 0 | Status: DISCONTINUED | OUTPATIENT
Start: 2019-09-06 | End: 2019-09-07

## 2019-09-06 RX ADMIN — Medication 1 TABLET(S): at 12:44

## 2019-09-06 RX ADMIN — HEPARIN SODIUM 5000 UNIT(S): 5000 INJECTION INTRAVENOUS; SUBCUTANEOUS at 06:13

## 2019-09-06 RX ADMIN — SODIUM CHLORIDE 3 MILLILITER(S): 9 INJECTION INTRAMUSCULAR; INTRAVENOUS; SUBCUTANEOUS at 06:13

## 2019-09-06 RX ADMIN — PROGESTERONE 100 MILLIGRAM(S): 200 CAPSULE, LIQUID FILLED ORAL at 23:35

## 2019-09-06 RX ADMIN — SODIUM CHLORIDE 3 MILLILITER(S): 9 INJECTION INTRAMUSCULAR; INTRAVENOUS; SUBCUTANEOUS at 22:12

## 2019-09-06 RX ADMIN — Medication 50 GM/HR: at 19:06

## 2019-09-06 RX ADMIN — Medication 50 GM/HR: at 19:32

## 2019-09-06 RX ADMIN — Medication 200 MILLIGRAM(S): at 19:38

## 2019-09-06 RX ADMIN — Medication 200 GRAM(S): at 18:45

## 2019-09-06 RX ADMIN — Medication 40 MILLIGRAM(S): at 18:36

## 2019-09-06 RX ADMIN — Medication 60 MILLIGRAM(S): at 17:38

## 2019-09-06 RX ADMIN — Medication 200 MILLIGRAM(S): at 06:13

## 2019-09-06 RX ADMIN — SODIUM CHLORIDE 3 MILLILITER(S): 9 INJECTION INTRAMUSCULAR; INTRAVENOUS; SUBCUTANEOUS at 15:45

## 2019-09-06 RX ADMIN — Medication 200 MILLIGRAM(S): at 19:18

## 2019-09-06 RX ADMIN — Medication 20 MILLIGRAM(S): at 18:19

## 2019-09-06 RX ADMIN — Medication 200 MILLIGRAM(S): at 15:45

## 2019-09-06 RX ADMIN — Medication 10 MILLIGRAM(S): at 21:00

## 2019-09-06 NOTE — PROGRESS NOTE ADULT - SUBJECTIVE AND OBJECTIVE BOX
Patient seen and examined at bedside, no acute overnight events. No acute complaints. Pt reports good fetal movement.  Denies leakage of fluid, contractions, vaginal bleeding.   Denies HA, epigastric pain, blurred vision, CP, SOB, N/V, fevers, and chills.    Vital Signs Last 24 Hours  T(C): 36.4 (09-06-19 @ 06:11), Max: 36.8 (09-05-19 @ 14:43)  HR: 86 (09-06-19 @ 06:11) (81 - 98)  BP: 144/83 (09-06-19 @ 06:11) (122/65 - 151/77)  RR: 16 (09-06-19 @ 06:11) (16 - 19)  SpO2: 98% (09-06-19 @ 06:11) (96% - 100%)    Physical Exam:  General: NAD  Abdomen: Soft, non-tender, gravid  Ext: No pain or swelling    Labs:        PT/INR - ( 09-04 @ 06:12 )   PT: 10.9 SEC;   INR: 0.96     PTT - ( 09-04 @ 06:12 )  PTT:29.2 SEC    Uric Acid: (09-04 @ 06:12)  6.6      Fibrinogen: (09-04 @ 06:12)  408.0    LDH: (09-04 @ 06:12)  292        MEDICATIONS  (STANDING):  heparin  Injectable 5000 Unit(s) SubCutaneous every 12 hours  labetalol 200 milliGRAM(s) Oral three times a day  NIFEdipine XL 60 milliGRAM(s) Oral daily  oxytocin Infusion 333.333 milliUNIT(s)/Min (1000 mL/Hr) IV Continuous <Continuous>  prenatal multivitamin 1 Tablet(s) Oral daily  progesterone Vaginal Insert 100 milliGRAM(s) Vaginal at bedtime  sodium chloride 0.9% lock flush 3 milliLiter(s) IV Push every 8 hours    MEDICATIONS  (PRN):

## 2019-09-06 NOTE — PROGRESS NOTE ADULT - PROBLEM SELECTOR PLAN 1
1. Maternal well being  - CV: hemodynamically stable, continue procardia 30mg XL and labetalol 200mg TID for BP control   - Resp: saturating well  - GI: reg diet  - Heme: HSQ for DVT ppx, bi-weekly HELLP labs - will repeat tomorrow morning  - OB: vaginal progesterone for cervical insufficiency  - FEN: SLIV  - Neuro: s/p Mg for seizure prophylaxis. Will restart magnesium if BPs become severe range    2. Fetal well being  - NST BID  - BPP Tues/Fri  - s/p BMZ for fetal lung maturity  - PNV  - GBS negative  - plan for IOL at 34 weeks    AMERICA Mcdonough pgy3

## 2019-09-06 NOTE — PROGRESS NOTE ADULT - ASSESSMENT
30 yo  31w4d a/w sPEC requiring multiple IV pushes of antihypertensives upon admission. Clinical condition improving with BP control on standing antihypertensives. Antepartum course complicated by new diagnosis of IUGR (7% on sono 9/3) and cervical insufficiency with no acute changes upon admission.

## 2019-09-06 NOTE — PROGRESS NOTE ADULT - SUBJECTIVE AND OBJECTIVE BOX
R3 Antepartum Progress Note - HD#7   (back note 2/2 clinical activities - patient seen at 730am)    Subjective  Patient seen and examined at bedside, no acute overnight events. This AM, no acute complaints. Reports good FM and denies CTX, LOF, VB. Denies HA, changes in vision, CP, SOB, palpitations, LE edema/tenderness.      Objective  Vital Signs Last 24 Hrs  T(C): 36.9 (06 Sep 2019 17:34), Max: 36.9 (06 Sep 2019 14:14)  T(F): 98.4 (06 Sep 2019 17:34), Max: 98.5 (06 Sep 2019 14:14)  HR: 87 (06 Sep 2019 19:20) (73 - 98)  BP: 136/83 (06 Sep 2019 19:20) (122/65 - 177/105)  BP(mean): --  RR: 17 (06 Sep 2019 17:34) (16 - 17)  SpO2: 98% (06 Sep 2019 19:24) (96% - 100%)    Physical Exam:  General: NAD  CV: RRR, no murmurs, S1, S2  Resp: CTA-B, symmetrical expansion  Abdomen: Soft, non-tender, non-distended, gravid  Ext: no edema/tenderness in LE b/l    Labs:    Blood Type: A Positive  Antibody Screen: --               8.0    5.97  )-----------( 203      ( 09-04 @ 06:12 )             26.9     09-04-19 @ 06:12    136  |  106  |  18             --------------------------< 79     4.5  |  20<L>  | 0.93    eGFR AA: 96  eGFR N-AA: 83    Calcium: 8.1<L>  Phosphorus: --  Magnesium: --    AST: 27    ALT: 23  AlkPhos: 109  Protein: 5.3<L>  Albumin: 2.8<L>  TBili: 0.3  D-Bili: --    MEDICATIONS  (STANDING):  heparin  Injectable 5000 Unit(s) SubCutaneous every 12 hours  labetalol 200 milliGRAM(s) Oral three times a day  NIFEdipine XL 60 milliGRAM(s) Oral daily  oxytocin Infusion 333.333 milliUNIT(s)/Min (1000 mL/Hr) IV Continuous <Continuous>  prenatal multivitamin 1 Tablet(s) Oral daily  progesterone Vaginal Insert 100 milliGRAM(s) Vaginal at bedtime  sodium chloride 0.9% lock flush 3 milliLiter(s) IV Push every 8 hours

## 2019-09-06 NOTE — PROGRESS NOTE ADULT - PROBLEM SELECTOR PLAN 1
BP controlled on current medications Continue Labetalol 200mg po tid and Nifedipine XL 60mg qd, NST BID.

## 2019-09-07 ENCOUNTER — RESULT REVIEW (OUTPATIENT)
Age: 29
End: 2019-09-07

## 2019-09-07 DIAGNOSIS — O14.13 SEVERE PRE-ECLAMPSIA, THIRD TRIMESTER: ICD-10-CM

## 2019-09-07 DIAGNOSIS — E87.5 HYPERKALEMIA: ICD-10-CM

## 2019-09-07 LAB
ALBUMIN SERPL ELPH-MCNC: 2.8 G/DL — LOW (ref 3.3–5)
ALBUMIN SERPL ELPH-MCNC: 2.9 G/DL — LOW (ref 3.3–5)
ALBUMIN SERPL ELPH-MCNC: 3 G/DL — LOW (ref 3.3–5)
ALBUMIN SERPL ELPH-MCNC: 3.1 G/DL — LOW (ref 3.3–5)
ALP SERPL-CCNC: 122 U/L — HIGH (ref 40–120)
ALP SERPL-CCNC: 124 U/L — HIGH (ref 40–120)
ALP SERPL-CCNC: 124 U/L — HIGH (ref 40–120)
ALP SERPL-CCNC: 125 U/L — HIGH (ref 40–120)
ALP SERPL-CCNC: 127 U/L — HIGH (ref 40–120)
ALP SERPL-CCNC: 133 U/L — HIGH (ref 40–120)
ALT FLD-CCNC: 31 U/L — SIGNIFICANT CHANGE UP (ref 4–33)
ALT FLD-CCNC: 32 U/L — SIGNIFICANT CHANGE UP (ref 4–33)
ALT FLD-CCNC: 33 U/L — SIGNIFICANT CHANGE UP (ref 4–33)
ALT FLD-CCNC: 33 U/L — SIGNIFICANT CHANGE UP (ref 4–33)
ANION GAP SERPL CALC-SCNC: 10 MMO/L — SIGNIFICANT CHANGE UP (ref 7–14)
ANION GAP SERPL CALC-SCNC: 10 MMO/L — SIGNIFICANT CHANGE UP (ref 7–14)
ANION GAP SERPL CALC-SCNC: 12 MMO/L — SIGNIFICANT CHANGE UP (ref 7–14)
ANION GAP SERPL CALC-SCNC: 9 MMO/L — SIGNIFICANT CHANGE UP (ref 7–14)
ANISOCYTOSIS BLD QL: SIGNIFICANT CHANGE UP
APPEARANCE UR: CLEAR — SIGNIFICANT CHANGE UP
APTT BLD: 25.4 SEC — LOW (ref 27.5–36.3)
APTT BLD: 29 SEC — SIGNIFICANT CHANGE UP (ref 27.5–36.3)
APTT BLD: 29.8 SEC — SIGNIFICANT CHANGE UP (ref 27.5–36.3)
AST SERPL-CCNC: 42 U/L — HIGH (ref 4–32)
AST SERPL-CCNC: 44 U/L — HIGH (ref 4–32)
AST SERPL-CCNC: 44 U/L — HIGH (ref 4–32)
AST SERPL-CCNC: 45 U/L — HIGH (ref 4–32)
AST SERPL-CCNC: 45 U/L — HIGH (ref 4–32)
AST SERPL-CCNC: 46 U/L — HIGH (ref 4–32)
BACTERIA # UR AUTO: NEGATIVE — SIGNIFICANT CHANGE UP
BASE EXCESS BLDA CALC-SCNC: -3 MMOL/L — SIGNIFICANT CHANGE UP
BASE EXCESS BLDV CALC-SCNC: -3.9 MMOL/L — SIGNIFICANT CHANGE UP
BASOPHILS # BLD AUTO: 0.01 K/UL — SIGNIFICANT CHANGE UP (ref 0–0.2)
BASOPHILS # BLD AUTO: 0.02 K/UL — SIGNIFICANT CHANGE UP (ref 0–0.2)
BASOPHILS # BLD AUTO: 0.03 K/UL — SIGNIFICANT CHANGE UP (ref 0–0.2)
BASOPHILS NFR BLD AUTO: 0.1 % — SIGNIFICANT CHANGE UP (ref 0–2)
BASOPHILS NFR BLD AUTO: 0.3 % — SIGNIFICANT CHANGE UP (ref 0–2)
BASOPHILS NFR BLD AUTO: 0.4 % — SIGNIFICANT CHANGE UP (ref 0–2)
BASOPHILS NFR SPEC: 0.9 % — SIGNIFICANT CHANGE UP (ref 0–2)
BILIRUB SERPL-MCNC: 0.3 MG/DL — SIGNIFICANT CHANGE UP (ref 0.2–1.2)
BILIRUB SERPL-MCNC: 0.3 MG/DL — SIGNIFICANT CHANGE UP (ref 0.2–1.2)
BILIRUB SERPL-MCNC: 0.4 MG/DL — SIGNIFICANT CHANGE UP (ref 0.2–1.2)
BILIRUB SERPL-MCNC: 0.4 MG/DL — SIGNIFICANT CHANGE UP (ref 0.2–1.2)
BILIRUB SERPL-MCNC: 0.5 MG/DL — SIGNIFICANT CHANGE UP (ref 0.2–1.2)
BILIRUB SERPL-MCNC: 0.5 MG/DL — SIGNIFICANT CHANGE UP (ref 0.2–1.2)
BILIRUB UR-MCNC: NEGATIVE — SIGNIFICANT CHANGE UP
BLASTS # FLD: 0 % — SIGNIFICANT CHANGE UP (ref 0–0)
BLOOD UR QL VISUAL: HIGH
BUN SERPL-MCNC: 23 MG/DL — SIGNIFICANT CHANGE UP (ref 7–23)
BUN SERPL-MCNC: 24 MG/DL — HIGH (ref 7–23)
BUN SERPL-MCNC: 25 MG/DL — HIGH (ref 7–23)
BUN SERPL-MCNC: 27 MG/DL — HIGH (ref 7–23)
BUN SERPL-MCNC: 27 MG/DL — HIGH (ref 7–23)
CALCIUM SERPL-MCNC: 7.7 MG/DL — LOW (ref 8.4–10.5)
CALCIUM SERPL-MCNC: 7.9 MG/DL — LOW (ref 8.4–10.5)
CALCIUM SERPL-MCNC: 8 MG/DL — LOW (ref 8.4–10.5)
CALCIUM SERPL-MCNC: 8.4 MG/DL — SIGNIFICANT CHANGE UP (ref 8.4–10.5)
CALCIUM SERPL-MCNC: 8.5 MG/DL — SIGNIFICANT CHANGE UP (ref 8.4–10.5)
CALCIUM SERPL-MCNC: 8.5 MG/DL — SIGNIFICANT CHANGE UP (ref 8.4–10.5)
CALCIUM SERPL-MCNC: 8.8 MG/DL — SIGNIFICANT CHANGE UP (ref 8.4–10.5)
CHLORIDE SERPL-SCNC: 101 MMOL/L — SIGNIFICANT CHANGE UP (ref 98–107)
CHLORIDE SERPL-SCNC: 102 MMOL/L — SIGNIFICANT CHANGE UP (ref 98–107)
CHLORIDE SERPL-SCNC: 103 MMOL/L — SIGNIFICANT CHANGE UP (ref 98–107)
CHLORIDE UR-SCNC: 92 MMOL/L — SIGNIFICANT CHANGE UP
CO2 SERPL-SCNC: 18 MMOL/L — LOW (ref 22–31)
CO2 SERPL-SCNC: 19 MMOL/L — LOW (ref 22–31)
CO2 SERPL-SCNC: 19 MMOL/L — LOW (ref 22–31)
CO2 SERPL-SCNC: 20 MMOL/L — LOW (ref 22–31)
CO2 SERPL-SCNC: 21 MMOL/L — LOW (ref 22–31)
COLOR SPEC: SIGNIFICANT CHANGE UP
CREAT SERPL-MCNC: 1.01 MG/DL — SIGNIFICANT CHANGE UP (ref 0.5–1.3)
CREAT SERPL-MCNC: 1.01 MG/DL — SIGNIFICANT CHANGE UP (ref 0.5–1.3)
CREAT SERPL-MCNC: 1.07 MG/DL — SIGNIFICANT CHANGE UP (ref 0.5–1.3)
CREAT SERPL-MCNC: 1.07 MG/DL — SIGNIFICANT CHANGE UP (ref 0.5–1.3)
CREAT SERPL-MCNC: 1.12 MG/DL — SIGNIFICANT CHANGE UP (ref 0.5–1.3)
CREAT SERPL-MCNC: 1.12 MG/DL — SIGNIFICANT CHANGE UP (ref 0.5–1.3)
CREAT SERPL-MCNC: 1.15 MG/DL — SIGNIFICANT CHANGE UP (ref 0.5–1.3)
EOSINOPHIL # BLD AUTO: 0.01 K/UL — SIGNIFICANT CHANGE UP (ref 0–0.5)
EOSINOPHIL # BLD AUTO: 0.02 K/UL — SIGNIFICANT CHANGE UP (ref 0–0.5)
EOSINOPHIL # BLD AUTO: 0.04 K/UL — SIGNIFICANT CHANGE UP (ref 0–0.5)
EOSINOPHIL NFR BLD AUTO: 0.1 % — SIGNIFICANT CHANGE UP (ref 0–6)
EOSINOPHIL NFR BLD AUTO: 0.3 % — SIGNIFICANT CHANGE UP (ref 0–6)
EOSINOPHIL NFR BLD AUTO: 0.6 % — SIGNIFICANT CHANGE UP (ref 0–6)
EOSINOPHIL NFR FLD: 0 % — SIGNIFICANT CHANGE UP (ref 0–6)
FIBRINOGEN PPP-MCNC: 523.8 MG/DL — HIGH (ref 350–510)
FIBRINOGEN PPP-MCNC: 548.4 MG/DL — HIGH (ref 350–510)
FIBRINOGEN PPP-MCNC: 584.3 MG/DL — HIGH (ref 350–510)
GAS PNL BLDV: 130 MMOL/L — LOW (ref 136–146)
GIANT PLATELETS BLD QL SMEAR: PRESENT — SIGNIFICANT CHANGE UP
GLUCOSE BLDA-MCNC: 78 MG/DL — SIGNIFICANT CHANGE UP (ref 70–99)
GLUCOSE BLDC GLUCOMTR-MCNC: 190 MG/DL — HIGH (ref 70–99)
GLUCOSE BLDC GLUCOMTR-MCNC: 81 MG/DL — SIGNIFICANT CHANGE UP (ref 70–99)
GLUCOSE BLDC GLUCOMTR-MCNC: 96 MG/DL — SIGNIFICANT CHANGE UP (ref 70–99)
GLUCOSE BLDV-MCNC: 64 MG/DL — LOW (ref 70–99)
GLUCOSE SERPL-MCNC: 105 MG/DL — HIGH (ref 70–99)
GLUCOSE SERPL-MCNC: 68 MG/DL — LOW (ref 70–99)
GLUCOSE SERPL-MCNC: 84 MG/DL — SIGNIFICANT CHANGE UP (ref 70–99)
GLUCOSE SERPL-MCNC: 86 MG/DL — SIGNIFICANT CHANGE UP (ref 70–99)
GLUCOSE SERPL-MCNC: 86 MG/DL — SIGNIFICANT CHANGE UP (ref 70–99)
GLUCOSE SERPL-MCNC: 95 MG/DL — SIGNIFICANT CHANGE UP (ref 70–99)
GLUCOSE SERPL-MCNC: 95 MG/DL — SIGNIFICANT CHANGE UP (ref 70–99)
GLUCOSE UR-MCNC: NEGATIVE — SIGNIFICANT CHANGE UP
HAPTOGLOB SERPL-MCNC: < 20 MG/DL — LOW (ref 34–200)
HCO3 BLDA-SCNC: 22 MMOL/L — SIGNIFICANT CHANGE UP (ref 22–26)
HCO3 BLDV-SCNC: 21 MMOL/L — SIGNIFICANT CHANGE UP (ref 20–27)
HCT VFR BLD CALC: 30.4 % — LOW (ref 34.5–45)
HCT VFR BLD CALC: 32.9 % — LOW (ref 34.5–45)
HCT VFR BLD CALC: 33.3 % — LOW (ref 34.5–45)
HCT VFR BLD CALC: 34.3 % — LOW (ref 34.5–45)
HCT VFR BLDA CALC: 29.7 % — LOW (ref 34.5–46.5)
HCT VFR BLDV CALC: 30.7 % — LOW (ref 34.5–45)
HGB BLD-MCNC: 10 G/DL — LOW (ref 11.5–15.5)
HGB BLD-MCNC: 10.1 G/DL — LOW (ref 11.5–15.5)
HGB BLD-MCNC: 10.7 G/DL — LOW (ref 11.5–15.5)
HGB BLD-MCNC: 9.7 G/DL — LOW (ref 11.5–15.5)
HGB BLDA-MCNC: 9.6 G/DL — LOW (ref 11.5–15.5)
HGB BLDV-MCNC: 9.9 G/DL — LOW (ref 11.5–15.5)
HYALINE CASTS # UR AUTO: NEGATIVE — SIGNIFICANT CHANGE UP
IMM GRANULOCYTES NFR BLD AUTO: 1.4 % — SIGNIFICANT CHANGE UP (ref 0–1.5)
IMM GRANULOCYTES NFR BLD AUTO: 2 % — HIGH (ref 0–1.5)
IMM GRANULOCYTES NFR BLD AUTO: 2.1 % — HIGH (ref 0–1.5)
INR BLD: 0.91 — SIGNIFICANT CHANGE UP (ref 0.88–1.17)
INR BLD: 0.93 — SIGNIFICANT CHANGE UP (ref 0.88–1.17)
INR BLD: 0.94 — SIGNIFICANT CHANGE UP (ref 0.88–1.17)
KETONES UR-MCNC: NEGATIVE — SIGNIFICANT CHANGE UP
LACTATE BLDV-MCNC: 1.1 MMOL/L — SIGNIFICANT CHANGE UP (ref 0.5–2)
LACTATE SERPL-SCNC: 1.5 MMOL/L — SIGNIFICANT CHANGE UP (ref 0.5–2)
LDH SERPL L TO P-CCNC: 336 U/L — HIGH (ref 135–225)
LDH SERPL L TO P-CCNC: 345 U/L — HIGH (ref 135–225)
LEUKOCYTE ESTERASE UR-ACNC: NEGATIVE — SIGNIFICANT CHANGE UP
LYMPHOCYTES # BLD AUTO: 0.79 K/UL — LOW (ref 1–3.3)
LYMPHOCYTES # BLD AUTO: 0.99 K/UL — LOW (ref 1–3.3)
LYMPHOCYTES # BLD AUTO: 1.15 K/UL — SIGNIFICANT CHANGE UP (ref 1–3.3)
LYMPHOCYTES # BLD AUTO: 11.4 % — LOW (ref 13–44)
LYMPHOCYTES # BLD AUTO: 14.3 % — SIGNIFICANT CHANGE UP (ref 13–44)
LYMPHOCYTES # BLD AUTO: 16.2 % — SIGNIFICANT CHANGE UP (ref 13–44)
LYMPHOCYTES NFR SPEC AUTO: 13.9 % — SIGNIFICANT CHANGE UP (ref 13–44)
MAGNESIUM SERPL-MCNC: 4.1 MG/DL — HIGH (ref 1.6–2.6)
MAGNESIUM SERPL-MCNC: 4.7 MG/DL — HIGH (ref 1.6–2.6)
MAGNESIUM SERPL-MCNC: 5.2 MG/DL — HIGH (ref 1.6–2.6)
MAGNESIUM SERPL-MCNC: 5.3 MG/DL — HIGH (ref 1.6–2.6)
MAGNESIUM SERPL-MCNC: 5.4 MG/DL — HIGH (ref 1.6–2.6)
MAGNESIUM SERPL-MCNC: 6.2 MG/DL — HIGH (ref 1.6–2.6)
MAGNESIUM SERPL-MCNC: 7.3 MG/DL — CRITICAL HIGH (ref 1.6–2.6)
MCHC RBC-ENTMCNC: 24.9 PG — LOW (ref 27–34)
MCHC RBC-ENTMCNC: 25.1 PG — LOW (ref 27–34)
MCHC RBC-ENTMCNC: 25.4 PG — LOW (ref 27–34)
MCHC RBC-ENTMCNC: 25.9 PG — LOW (ref 27–34)
MCHC RBC-ENTMCNC: 30 % — LOW (ref 32–36)
MCHC RBC-ENTMCNC: 30.7 % — LOW (ref 32–36)
MCHC RBC-ENTMCNC: 31.2 % — LOW (ref 32–36)
MCHC RBC-ENTMCNC: 31.9 % — LOW (ref 32–36)
MCV RBC AUTO: 81.1 FL — SIGNIFICANT CHANGE UP (ref 80–100)
MCV RBC AUTO: 81.5 FL — SIGNIFICANT CHANGE UP (ref 80–100)
MCV RBC AUTO: 81.6 FL — SIGNIFICANT CHANGE UP (ref 80–100)
MCV RBC AUTO: 83 FL — SIGNIFICANT CHANGE UP (ref 80–100)
METAMYELOCYTES # FLD: 0 % — SIGNIFICANT CHANGE UP (ref 0–1)
MICROCYTES BLD QL: SIGNIFICANT CHANGE UP
MONOCYTES # BLD AUTO: 0.15 K/UL — SIGNIFICANT CHANGE UP (ref 0–0.9)
MONOCYTES # BLD AUTO: 0.44 K/UL — SIGNIFICANT CHANGE UP (ref 0–0.9)
MONOCYTES # BLD AUTO: 0.57 K/UL — SIGNIFICANT CHANGE UP (ref 0–0.9)
MONOCYTES NFR BLD AUTO: 2.2 % — SIGNIFICANT CHANGE UP (ref 2–14)
MONOCYTES NFR BLD AUTO: 6.3 % — SIGNIFICANT CHANGE UP (ref 2–14)
MONOCYTES NFR BLD AUTO: 8 % — SIGNIFICANT CHANGE UP (ref 2–14)
MONOCYTES NFR BLD: 3.5 % — SIGNIFICANT CHANGE UP (ref 2–9)
MYELOCYTES NFR BLD: 0 % — SIGNIFICANT CHANGE UP (ref 0–0)
NEUTROPHIL AB SER-ACNC: 78.2 % — HIGH (ref 43–77)
NEUTROPHILS # BLD AUTO: 5.18 K/UL — SIGNIFICANT CHANGE UP (ref 1.8–7.4)
NEUTROPHILS # BLD AUTO: 5.32 K/UL — SIGNIFICANT CHANGE UP (ref 1.8–7.4)
NEUTROPHILS # BLD AUTO: 5.84 K/UL — SIGNIFICANT CHANGE UP (ref 1.8–7.4)
NEUTROPHILS NFR BLD AUTO: 72.8 % — SIGNIFICANT CHANGE UP (ref 43–77)
NEUTROPHILS NFR BLD AUTO: 76.7 % — SIGNIFICANT CHANGE UP (ref 43–77)
NEUTROPHILS NFR BLD AUTO: 84.8 % — HIGH (ref 43–77)
NEUTS BAND # BLD: 0 % — SIGNIFICANT CHANGE UP (ref 0–6)
NITRITE UR-MCNC: NEGATIVE — SIGNIFICANT CHANGE UP
NRBC # FLD: 0 K/UL — SIGNIFICANT CHANGE UP (ref 0–0)
OTHER - HEMATOLOGY %: 0 — SIGNIFICANT CHANGE UP
PCO2 BLDA: 36 MMHG — SIGNIFICANT CHANGE UP (ref 32–48)
PCO2 BLDV: 32 MMHG — LOW (ref 41–51)
PH BLDA: 7.39 PH — SIGNIFICANT CHANGE UP (ref 7.35–7.45)
PH BLDV: 7.41 PH — SIGNIFICANT CHANGE UP (ref 7.32–7.43)
PH UR: 6 — SIGNIFICANT CHANGE UP (ref 5–8)
PHOSPHATE SERPL-MCNC: 5.3 MG/DL — HIGH (ref 2.5–4.5)
PHOSPHATE SERPL-MCNC: 5.9 MG/DL — HIGH (ref 2.5–4.5)
PHOSPHATE SERPL-MCNC: 6 MG/DL — HIGH (ref 2.5–4.5)
PLATELET # BLD AUTO: 181 K/UL — SIGNIFICANT CHANGE UP (ref 150–400)
PLATELET # BLD AUTO: 233 K/UL — SIGNIFICANT CHANGE UP (ref 150–400)
PLATELET # BLD AUTO: 240 K/UL — SIGNIFICANT CHANGE UP (ref 150–400)
PLATELET # BLD AUTO: 242 K/UL — SIGNIFICANT CHANGE UP (ref 150–400)
PLATELET COUNT - ESTIMATE: NORMAL — SIGNIFICANT CHANGE UP
PMV BLD: 10.8 FL — SIGNIFICANT CHANGE UP (ref 7–13)
PMV BLD: 11.2 FL — SIGNIFICANT CHANGE UP (ref 7–13)
PMV BLD: 11.6 FL — SIGNIFICANT CHANGE UP (ref 7–13)
PMV BLD: 12.1 FL — SIGNIFICANT CHANGE UP (ref 7–13)
PO2 BLDA: 39 MMHG — CRITICAL LOW (ref 83–108)
PO2 BLDV: 87 MMHG — HIGH (ref 35–40)
POIKILOCYTOSIS BLD QL AUTO: SIGNIFICANT CHANGE UP
POLYCHROMASIA BLD QL SMEAR: SIGNIFICANT CHANGE UP
POTASSIUM BLDA-SCNC: 6.4 MMOL/L — CRITICAL HIGH (ref 3.4–4.5)
POTASSIUM BLDV-SCNC: 5.3 MMOL/L — HIGH (ref 3.4–4.5)
POTASSIUM SERPL-MCNC: 5.7 MMOL/L — HIGH (ref 3.5–5.3)
POTASSIUM SERPL-MCNC: 5.7 MMOL/L — HIGH (ref 3.5–5.3)
POTASSIUM SERPL-MCNC: 5.8 MMOL/L — HIGH (ref 3.5–5.3)
POTASSIUM SERPL-MCNC: 5.8 MMOL/L — HIGH (ref 3.5–5.3)
POTASSIUM SERPL-MCNC: 6.2 MMOL/L — CRITICAL HIGH (ref 3.5–5.3)
POTASSIUM SERPL-MCNC: 6.7 MMOL/L — CRITICAL HIGH (ref 3.5–5.3)
POTASSIUM SERPL-MCNC: 7.3 MMOL/L — CRITICAL HIGH (ref 3.5–5.3)
POTASSIUM SERPL-SCNC: 5.7 MMOL/L — HIGH (ref 3.5–5.3)
POTASSIUM SERPL-SCNC: 5.7 MMOL/L — HIGH (ref 3.5–5.3)
POTASSIUM SERPL-SCNC: 5.8 MMOL/L — HIGH (ref 3.5–5.3)
POTASSIUM SERPL-SCNC: 5.8 MMOL/L — HIGH (ref 3.5–5.3)
POTASSIUM SERPL-SCNC: 6.2 MMOL/L — CRITICAL HIGH (ref 3.5–5.3)
POTASSIUM SERPL-SCNC: 6.7 MMOL/L — CRITICAL HIGH (ref 3.5–5.3)
POTASSIUM SERPL-SCNC: 7.3 MMOL/L — CRITICAL HIGH (ref 3.5–5.3)
POTASSIUM UR-SCNC: 22 MMOL/L — SIGNIFICANT CHANGE UP
PROMYELOCYTES # FLD: 0 % — SIGNIFICANT CHANGE UP (ref 0–0)
PROT SERPL-MCNC: 5.7 G/DL — LOW (ref 6–8.3)
PROT SERPL-MCNC: 5.8 G/DL — LOW (ref 6–8.3)
PROT SERPL-MCNC: 5.8 G/DL — LOW (ref 6–8.3)
PROT SERPL-MCNC: 5.9 G/DL — LOW (ref 6–8.3)
PROT SERPL-MCNC: 5.9 G/DL — LOW (ref 6–8.3)
PROT SERPL-MCNC: 6 G/DL — SIGNIFICANT CHANGE UP (ref 6–8.3)
PROT UR-MCNC: 200 — HIGH
PROTHROM AB SERPL-ACNC: 10.1 SEC — SIGNIFICANT CHANGE UP (ref 9.8–13.1)
PROTHROM AB SERPL-ACNC: 10.3 SEC — SIGNIFICANT CHANGE UP (ref 9.8–13.1)
PROTHROM AB SERPL-ACNC: 10.4 SEC — SIGNIFICANT CHANGE UP (ref 9.8–13.1)
RBC # BLD: 3.75 M/UL — LOW (ref 3.8–5.2)
RBC # BLD: 4.01 M/UL — SIGNIFICANT CHANGE UP (ref 3.8–5.2)
RBC # BLD: 4.03 M/UL — SIGNIFICANT CHANGE UP (ref 3.8–5.2)
RBC # BLD: 4.21 M/UL — SIGNIFICANT CHANGE UP (ref 3.8–5.2)
RBC # FLD: 16.2 % — HIGH (ref 10.3–14.5)
RBC # FLD: 16.3 % — HIGH (ref 10.3–14.5)
RBC # FLD: 16.4 % — HIGH (ref 10.3–14.5)
RBC # FLD: 16.4 % — HIGH (ref 10.3–14.5)
RBC CASTS # UR COMP ASSIST: HIGH (ref 0–?)
RETICS #: 70 K/UL — SIGNIFICANT CHANGE UP (ref 25–125)
RETICS/RBC NFR: 1.7 % — SIGNIFICANT CHANGE UP (ref 0.5–2.5)
SAO2 % BLDA: 62.9 % — LOW (ref 95–99)
SAO2 % BLDV: 96.1 % — HIGH (ref 60–85)
SODIUM BLDA-SCNC: 129 MMOL/L — LOW (ref 136–146)
SODIUM SERPL-SCNC: 131 MMOL/L — LOW (ref 135–145)
SODIUM SERPL-SCNC: 131 MMOL/L — LOW (ref 135–145)
SODIUM SERPL-SCNC: 132 MMOL/L — LOW (ref 135–145)
SODIUM SERPL-SCNC: 133 MMOL/L — LOW (ref 135–145)
SODIUM UR-SCNC: 84 MMOL/L — SIGNIFICANT CHANGE UP
SP GR SPEC: 1.01 — SIGNIFICANT CHANGE UP (ref 1–1.04)
SQUAMOUS # UR AUTO: SIGNIFICANT CHANGE UP
URATE SERPL-MCNC: 7.1 MG/DL — HIGH (ref 2.5–7)
URATE SERPL-MCNC: 7.2 MG/DL — HIGH (ref 2.5–7)
UROBILINOGEN FLD QL: NORMAL — SIGNIFICANT CHANGE UP
VARIANT LYMPHS # BLD: 3.5 % — SIGNIFICANT CHANGE UP
WBC # BLD: 6.48 K/UL — SIGNIFICANT CHANGE UP (ref 3.8–10.5)
WBC # BLD: 6.9 K/UL — SIGNIFICANT CHANGE UP (ref 3.8–10.5)
WBC # BLD: 6.94 K/UL — SIGNIFICANT CHANGE UP (ref 3.8–10.5)
WBC # BLD: 7.11 K/UL — SIGNIFICANT CHANGE UP (ref 3.8–10.5)
WBC # FLD AUTO: 6.48 K/UL — SIGNIFICANT CHANGE UP (ref 3.8–10.5)
WBC # FLD AUTO: 6.9 K/UL — SIGNIFICANT CHANGE UP (ref 3.8–10.5)
WBC # FLD AUTO: 6.94 K/UL — SIGNIFICANT CHANGE UP (ref 3.8–10.5)
WBC # FLD AUTO: 7.11 K/UL — SIGNIFICANT CHANGE UP (ref 3.8–10.5)
WBC UR QL: SIGNIFICANT CHANGE UP (ref 0–?)

## 2019-09-07 PROCEDURE — 99233 SBSQ HOSP IP/OBS HIGH 50: CPT

## 2019-09-07 PROCEDURE — 99223 1ST HOSP IP/OBS HIGH 75: CPT | Mod: 25

## 2019-09-07 PROCEDURE — 88307 TISSUE EXAM BY PATHOLOGIST: CPT | Mod: 26

## 2019-09-07 PROCEDURE — 93010 ELECTROCARDIOGRAM REPORT: CPT | Mod: 76

## 2019-09-07 RX ORDER — SODIUM CHLORIDE 9 MG/ML
1000 INJECTION, SOLUTION INTRAVENOUS
Refills: 0 | Status: DISCONTINUED | OUTPATIENT
Start: 2019-09-07 | End: 2019-09-08

## 2019-09-07 RX ORDER — ERTAPENEM SODIUM 1 G/1
1000 INJECTION, POWDER, LYOPHILIZED, FOR SOLUTION INTRAMUSCULAR; INTRAVENOUS EVERY 24 HOURS
Refills: 0 | Status: DISCONTINUED | OUTPATIENT
Start: 2019-09-07 | End: 2019-09-08

## 2019-09-07 RX ORDER — DEXTROSE 50 % IN WATER 50 %
25 SYRINGE (ML) INTRAVENOUS ONCE
Refills: 0 | Status: COMPLETED | OUTPATIENT
Start: 2019-09-07 | End: 2019-09-07

## 2019-09-07 RX ORDER — ACETAMINOPHEN 500 MG
1000 TABLET ORAL EVERY 6 HOURS
Refills: 0 | Status: DISCONTINUED | OUTPATIENT
Start: 2019-09-07 | End: 2019-09-09

## 2019-09-07 RX ORDER — LEVETIRACETAM 250 MG/1
500 TABLET, FILM COATED ORAL EVERY 12 HOURS
Refills: 0 | Status: COMPLETED | OUTPATIENT
Start: 2019-09-07 | End: 2019-09-08

## 2019-09-07 RX ORDER — MAGNESIUM HYDROXIDE 400 MG/1
30 TABLET, CHEWABLE ORAL
Refills: 0 | Status: DISCONTINUED | OUTPATIENT
Start: 2019-09-07 | End: 2019-09-12

## 2019-09-07 RX ORDER — MAGNESIUM SULFATE 500 MG/ML
2 VIAL (ML) INJECTION
Qty: 40 | Refills: 0 | Status: DISCONTINUED | OUTPATIENT
Start: 2019-09-07 | End: 2019-09-07

## 2019-09-07 RX ORDER — CITRIC ACID/SODIUM CITRATE 300-500 MG
15 SOLUTION, ORAL ORAL ONCE
Refills: 0 | Status: COMPLETED | OUTPATIENT
Start: 2019-09-07 | End: 2019-09-07

## 2019-09-07 RX ORDER — SIMETHICONE 80 MG/1
80 TABLET, CHEWABLE ORAL EVERY 4 HOURS
Refills: 0 | Status: DISCONTINUED | OUTPATIENT
Start: 2019-09-07 | End: 2019-09-12

## 2019-09-07 RX ORDER — HEPARIN SODIUM 5000 [USP'U]/ML
5000 INJECTION INTRAVENOUS; SUBCUTANEOUS EVERY 12 HOURS
Refills: 0 | Status: DISCONTINUED | OUTPATIENT
Start: 2019-09-07 | End: 2019-09-12

## 2019-09-07 RX ORDER — INSULIN HUMAN 100 [IU]/ML
10 INJECTION, SOLUTION SUBCUTANEOUS ONCE
Refills: 0 | Status: COMPLETED | OUTPATIENT
Start: 2019-09-07 | End: 2019-09-07

## 2019-09-07 RX ORDER — OXYCODONE HYDROCHLORIDE 5 MG/1
5 TABLET ORAL ONCE
Refills: 0 | Status: DISCONTINUED | OUTPATIENT
Start: 2019-09-07 | End: 2019-09-09

## 2019-09-07 RX ORDER — NIFEDIPINE 30 MG
60 TABLET, EXTENDED RELEASE 24 HR ORAL DAILY
Refills: 0 | Status: DISCONTINUED | OUTPATIENT
Start: 2019-09-07 | End: 2019-09-08

## 2019-09-07 RX ORDER — SODIUM CHLORIDE 9 MG/ML
1000 INJECTION, SOLUTION INTRAVENOUS
Refills: 0 | Status: DISCONTINUED | OUTPATIENT
Start: 2019-09-07 | End: 2019-09-07

## 2019-09-07 RX ORDER — OXYCODONE HYDROCHLORIDE 5 MG/1
5 TABLET ORAL
Refills: 0 | Status: DISCONTINUED | OUTPATIENT
Start: 2019-09-07 | End: 2019-09-09

## 2019-09-07 RX ORDER — TETANUS TOXOID, REDUCED DIPHTHERIA TOXOID AND ACELLULAR PERTUSSIS VACCINE, ADSORBED 5; 2.5; 8; 8; 2.5 [IU]/.5ML; [IU]/.5ML; UG/.5ML; UG/.5ML; UG/.5ML
0.5 SUSPENSION INTRAMUSCULAR ONCE
Refills: 0 | Status: COMPLETED | OUTPATIENT
Start: 2019-09-07

## 2019-09-07 RX ORDER — ACETAMINOPHEN 500 MG
975 TABLET ORAL EVERY 6 HOURS
Refills: 0 | Status: COMPLETED | OUTPATIENT
Start: 2019-09-07 | End: 2020-08-05

## 2019-09-07 RX ORDER — OXYTOCIN 10 UNIT/ML
333.33 VIAL (ML) INJECTION
Qty: 20 | Refills: 0 | Status: DISCONTINUED | OUTPATIENT
Start: 2019-09-07 | End: 2019-09-08

## 2019-09-07 RX ORDER — GLYCERIN ADULT
1 SUPPOSITORY, RECTAL RECTAL AT BEDTIME
Refills: 0 | Status: DISCONTINUED | OUTPATIENT
Start: 2019-09-07 | End: 2019-09-12

## 2019-09-07 RX ORDER — LABETALOL HCL 100 MG
20 TABLET ORAL ONCE
Refills: 0 | Status: COMPLETED | OUTPATIENT
Start: 2019-09-07 | End: 2019-09-07

## 2019-09-07 RX ORDER — FUROSEMIDE 40 MG
40 TABLET ORAL EVERY 12 HOURS
Refills: 0 | Status: DISCONTINUED | OUTPATIENT
Start: 2019-09-07 | End: 2019-09-07

## 2019-09-07 RX ORDER — SODIUM CHLORIDE 9 MG/ML
1000 INJECTION INTRAMUSCULAR; INTRAVENOUS; SUBCUTANEOUS
Refills: 0 | Status: DISCONTINUED | OUTPATIENT
Start: 2019-09-07 | End: 2019-09-07

## 2019-09-07 RX ORDER — DIPHENHYDRAMINE HCL 50 MG
25 CAPSULE ORAL EVERY 6 HOURS
Refills: 0 | Status: DISCONTINUED | OUTPATIENT
Start: 2019-09-07 | End: 2019-09-12

## 2019-09-07 RX ORDER — LABETALOL HCL 100 MG
400 TABLET ORAL EVERY 8 HOURS
Refills: 0 | Status: DISCONTINUED | OUTPATIENT
Start: 2019-09-07 | End: 2019-09-08

## 2019-09-07 RX ORDER — METOCLOPRAMIDE HCL 10 MG
10 TABLET ORAL ONCE
Refills: 0 | Status: COMPLETED | OUTPATIENT
Start: 2019-09-07 | End: 2019-09-07

## 2019-09-07 RX ORDER — MAGNESIUM SULFATE 500 MG/ML
1 VIAL (ML) INJECTION
Qty: 40 | Refills: 0 | Status: DISCONTINUED | OUTPATIENT
Start: 2019-09-07 | End: 2019-09-07

## 2019-09-07 RX ORDER — SODIUM POLYSTYRENE SULFONATE 4.1 MEQ/G
30 POWDER, FOR SUSPENSION ORAL THREE TIMES A DAY
Refills: 0 | Status: DISCONTINUED | OUTPATIENT
Start: 2019-09-07 | End: 2019-09-07

## 2019-09-07 RX ORDER — LANOLIN
1 OINTMENT (GRAM) TOPICAL EVERY 6 HOURS
Refills: 0 | Status: DISCONTINUED | OUTPATIENT
Start: 2019-09-07 | End: 2019-09-12

## 2019-09-07 RX ORDER — CALCIUM GLUCONATE 100 MG/ML
2 VIAL (ML) INTRAVENOUS ONCE
Refills: 0 | Status: COMPLETED | OUTPATIENT
Start: 2019-09-07 | End: 2019-09-07

## 2019-09-07 RX ORDER — FUROSEMIDE 40 MG
40 TABLET ORAL ONCE
Refills: 0 | Status: COMPLETED | OUTPATIENT
Start: 2019-09-07 | End: 2019-09-07

## 2019-09-07 RX ORDER — FAMOTIDINE 10 MG/ML
20 INJECTION INTRAVENOUS ONCE
Refills: 0 | Status: COMPLETED | OUTPATIENT
Start: 2019-09-07 | End: 2019-09-07

## 2019-09-07 RX ORDER — DOCUSATE SODIUM 100 MG
100 CAPSULE ORAL
Refills: 0 | Status: DISCONTINUED | OUTPATIENT
Start: 2019-09-07 | End: 2019-09-12

## 2019-09-07 RX ADMIN — Medication 25 MILLILITER(S): at 17:15

## 2019-09-07 RX ADMIN — INSULIN HUMAN 10 UNIT(S): 100 INJECTION, SOLUTION SUBCUTANEOUS at 13:45

## 2019-09-07 RX ADMIN — SODIUM CHLORIDE 100 MILLILITER(S): 9 INJECTION, SOLUTION INTRAVENOUS at 04:39

## 2019-09-07 RX ADMIN — Medication 40 MILLIGRAM(S): at 14:15

## 2019-09-07 RX ADMIN — Medication 20 MILLIGRAM(S): at 23:40

## 2019-09-07 RX ADMIN — SODIUM CHLORIDE 100 MILLILITER(S): 9 INJECTION, SOLUTION INTRAVENOUS at 08:30

## 2019-09-07 RX ADMIN — FAMOTIDINE 20 MILLIGRAM(S): 10 INJECTION INTRAVENOUS at 17:00

## 2019-09-07 RX ADMIN — Medication 25 GM/HR: at 09:21

## 2019-09-07 RX ADMIN — Medication 15 MILLILITER(S): at 17:00

## 2019-09-07 RX ADMIN — Medication 400 MILLIGRAM(S): at 08:30

## 2019-09-07 RX ADMIN — ERTAPENEM SODIUM 120 MILLIGRAM(S): 1 INJECTION, POWDER, LYOPHILIZED, FOR SOLUTION INTRAMUSCULAR; INTRAVENOUS at 22:37

## 2019-09-07 RX ADMIN — Medication 200 GRAM(S): at 13:00

## 2019-09-07 RX ADMIN — Medication 10 MILLIGRAM(S): at 17:00

## 2019-09-07 RX ADMIN — LEVETIRACETAM 400 MILLIGRAM(S): 250 TABLET, FILM COATED ORAL at 23:13

## 2019-09-07 NOTE — PROVIDER CONTACT NOTE (CRITICAL VALUE NOTIFICATION) - ASSESSMENT
PEC patient with abnormal fluid electrolytes
Pt asymptomatic. Denies HA, n/v, dizzines. Denies visual changes, epigastric pain.

## 2019-09-07 NOTE — PROGRESS NOTE ADULT - ASSESSMENT
30 yo  31w5d admitted with sPEC (requiring multiple IV pushes of antihypertensives throughout the hospital course and most recently over the shift (lab 20/40, hydral 10) and rise in creatinine) Antepartum course complicated by new diagnosis of IUGR (7% on ATU US 9/3), normal doppler study and amniotic fluid. Currently undergoing close inpatient monitoring.     Electrolyte abnormalities: Hyperkalemia/hypocalcemia: EKG to be done stat. Repeat labs. Pause magnesium now, calcium gluconate given. if repeat labs show persistent hyperkalemia, will consider D5 with insulin. Continue to trend labs q 6 hrs.     Close BP monitoring , continue Labetalol 400 mg tid and Procardia 30 XL for now, will titrate accordingly. I/V antihypertensives as needed per protocol  Fetal status reassuring for now. Continuous fetal monitoring, keep NPO for now    Will consider delivery if uncontrolled BPs, worsening lab parameters, neurological symptoms, signs of pulmonary edema, abruption or NRFHT. Pt. was counselled extensively about the diagnosis and plan, all questions answered    seen at bedside with Dr. Milton Gil MD  Fellow, KARINA 30 yo  31w5d admitted with sPEC (requiring multiple IV pushes of antihypertensives throughout the hospital course (with increases in PO dosing) and most recently over the shift (lab 20/40, hydral 10) and rise in creatinine) Antepartum course complicated by new diagnosis of IUGR (7% on ATU US 9/3), normal Doppler study and amniotic fluid. Currently undergoing close inpatient monitoring.     Electrolyte abnormalities: Hyperkalemia (now 7.2)/hypocalcemia: EKG NSR x 2 (low voltage). Pause magnesium now, calcium gluconate given. Recommend D5 with insulin. Continue to trend labs q 6 hrs.     Close BP monitoring, continue Labetalol 400 mg tid and Procardia 30 XL for now, will titrate accordingly. I/V antihypertensives as needed per protocol.  Fetal status reassuring for now. Continuous fetal monitoring, keep NPO for now    Will consider delivery if uncontrolled BPs, worsening lab parameters, neurological symptoms, signs of pulmonary edema, abruption or NRFHT. Pt. was counselled extensively about the diagnosis and plan, all questions answered    seen at bedside with Dr. Milton Gil MD  Fellow, [facility]

## 2019-09-07 NOTE — CHART NOTE - NSCHARTNOTEFT_GEN_A_CORE
Pt evaluated at bedside due to minimal variability seen on EFM.  Spoke with pt regarding contractions seen on toco, however pt reports she does not feel any ctx or pain.  denies headache, chest pain, shortness of breath, epigastric and RUQ pain.  While evaluating the pt, the Mg level resulted at 7.3 and Mg was turned off.    PE:  Gen: NAD  Abd: soft, gravid, non tender    EFM: baseline 120, minimal variability, no accel no decel  [place]: irregular, 3-5/10 min  BPP: 8/8, cephalic, YVROSE 18    28 yo  31w5d a/w sPEC requiring multiple IV pushes of antihypertensives throughout the hospital course and most recently over the shift (lab 20/40, hydral 10) Antepartum course complicated by new diagnosis of IUGR (7% on sono 9/3) and cervical insufficiency with no acute changes upon admission    new onset frequent contractions today with pt having severe range BPs. no bleeding on exam and pt is not in pain but there is concern for abruption  - pt to be transferred to L&D for closer monitoring    TLal PGY3  dw Dr Infante

## 2019-09-07 NOTE — CONSULT NOTE ADULT - SUBJECTIVE AND OBJECTIVE BOX
[facility] DIVISION OF KIDNEY DISEASES AND HYPERTENSION -- [phone]  -- INITIAL CONSULT NOTE  --------------------------------------------------------------------------------  HPI:        PAST HISTORY  --------------------------------------------------------------------------------  PAST MEDICAL & SURGICAL HISTORY:  No pertinent past medical history  No significant past surgical history    FAMILY HISTORY:    PAST SOCIAL HISTORY:    ALLERGIES & MEDICATIONS  --------------------------------------------------------------------------------  Allergies    No Known Drug Allergies  shellfish (Hives)    Intolerances      Standing Inpatient Medications  calcium gluconate IVPB 2 Gram(s) IV Intermittent once  dextrose 5%. 1000 milliLiter(s) IV Continuous <Continuous>  insulin regular  human recombinant. 10 Unit(s) IV Push once  labetalol 400 milliGRAM(s) Oral three times a day  magnesium sulfate Infusion 1 Gm/Hr IV Continuous <Continuous>  NIFEdipine XL 60 milliGRAM(s) Oral daily  oxytocin Infusion 333.333 milliUNIT(s)/Min IV Continuous <Continuous>  prenatal multivitamin 1 Tablet(s) Oral daily  progesterone Vaginal Insert 100 milliGRAM(s) Vaginal at bedtime  sodium chloride 0.9% lock flush 3 milliLiter(s) IV Push every 8 hours  sodium chloride 0.9%. 1000 milliLiter(s) IV Continuous <Continuous>    PRN Inpatient Medications      REVIEW OF SYSTEMS  --------------------------------------------------------------------------------  Gen: No fevers/chills  Skin: No rashes  Head/Eyes/Ears: Normal hearing,   Respiratory: No dyspnea, cough  CV: No chest pain  GI: No abdominal pain, diarrhea  : No dysuria, hematuria  MSK: No  edema  Heme: No easy bruising or bleeding  Psych: No significant depression    All other systems were reviewed and are negative, except as noted.    VITALS/PHYSICAL EXAM  --------------------------------------------------------------------------------  T(C): 36.6 (09-07-19 @ 06:58), Max: 36.9 (09-06-19 @ 14:14)  HR: 73 (09-07-19 @ 13:34) (72 - 92)  BP: 145/84 (09-07-19 @ 13:27) (109/66 - 177/105)  RR: 17 (09-06-19 @ 17:34) (17 - 17)  SpO2: 100% (09-07-19 @ 02:17) (95% - 100%)  Wt(kg): --        09-06-19 @ 07:01  -  09-07-19 @ 07:00  --------------------------------------------------------  IN: 1000 mL / OUT: 900 mL / NET: 100 mL      Physical Exam:  	Gen: NAD  	HEENT: MMM  	Pulm: CTA B/L  	CV: S1S2  	Abd: Soft, +BS   	Ext: No LE edema B/L  	Neuro: Awake  	Skin: Warm and dry  	Vascular access:    LABS/STUDIES  --------------------------------------------------------------------------------              9.7    6.94  >-----------<  233      [09-07-19 @ 09:00]              30.4     132  |  103  |  24  ----------------------------<  84      [09-07-19 @ 11:32]  7.3   |  19  |  1.01        Ca     7.9     [09-07-19 @ 11:32]      Mg     5.2     [09-07-19 @ 09:00]    TPro  5.7  /  Alb  2.8  /  TBili  0.4  /  DBili  x   /  AST  45  /  ALT  32  /  AlkPhos  124  [09-07-19 @ 11:32]    PT/INR: PT 10.4 , INR 0.94       [09-07-19 @ 09:00]  PTT: 29.0       [09-07-19 @ 09:00]    Uric acid 7.2      [09-07-19 @ 09:00]        [09-07-19 @ 09:00]    Creatinine Trend:  SCr 1.01 [09-07 @ 11:32]  SCr 1.01 [09-07 @ 09:00]  SCr 1.07 [09-07 @ 03:02]  SCr 1.01 [09-06 @ 19:00]  SCr 0.93 [09-04 @ 06:12]    Urinalysis - [08-31-19 @ 12:39]      Color YELLOW / Appearance Lt TURBID / SG 1.027 / pH 6.5      Gluc NEGATIVE / Ketone NEGATIVE  / Bili NEGATIVE / Urobili NORMAL       Blood SMALL / Protein 600 / Leuk Est NEGATIVE / Nitrite NEGATIVE      RBC 11-25 / WBC 6-10 / Hyaline 1+ / Gran  / Sq Epi MODERATE / Non Sq Epi  / Bacteria FEW        HBsAg NEGATIVE      [08-31-19 @ 18:10]    Syphilis Screen (Treponema Pallidum Ab) Negative      [08-31-19 @ 18:10] [facility] DIVISION OF KIDNEY DISEASES AND HYPERTENSION -- [phone]  -- INITIAL CONSULT NOTE  --------------------------------------------------------------------------------  HPI: 29-year-old female with no previous medical history admitted with pre-eclampsia. Nephrology consulted for hyperkalemia. Pt. was seen and examined with  at bedside. Pt. admitted with pre-eclampsia on 8/31/19 treated with labetalol and nifedipine. Pt. states has been eating fruits (bananas, peaches) during stay. Of note, pt. on LR as IVF. Pt. with no previous history of kidney disease or hyperkalemia. On lab review of [facility] Scr WNL (0.87) on 8/31/19 and slowly increase at 0.94 on 9/4/19 and 1.01 on 9/7/19. Serum potassium elevated at 5.9 on 9/6/19 and further rise at 7.3 on 9/7/19. 24-hr protein showed 3.3 g and UA with +RBC.     Pt. currently feels well, offers no complains. Denies CP, abdominal pain, nausea, vomiting, diarrhea, headache, dizziness.    PAST HISTORY  --------------------------------------------------------------------------------  PAST MEDICAL & SURGICAL HISTORY:  No pertinent past medical history  No significant past surgical history    FAMILY HISTORY:  Non contributory    PAST SOCIAL HISTORY:  NO ETOH    ALLERGIES & MEDICATIONS  --------------------------------------------------------------------------------  Allergies    No Known Drug Allergies  shellfish (Hives)    Intolerances      Standing Inpatient Medications  calcium gluconate IVPB 2 Gram(s) IV Intermittent once  dextrose 5%. 1000 milliLiter(s) IV Continuous <Continuous>  insulin regular  human recombinant. 10 Unit(s) IV Push once  labetalol 400 milliGRAM(s) Oral three times a day  magnesium sulfate Infusion 1 Gm/Hr IV Continuous <Continuous>  NIFEdipine XL 60 milliGRAM(s) Oral daily  oxytocin Infusion 333.333 milliUNIT(s)/Min IV Continuous <Continuous>  prenatal multivitamin 1 Tablet(s) Oral daily  progesterone Vaginal Insert 100 milliGRAM(s) Vaginal at bedtime  sodium chloride 0.9% lock flush 3 milliLiter(s) IV Push every 8 hours  sodium chloride 0.9%. 1000 milliLiter(s) IV Continuous <Continuous>    PRN Inpatient Medications      REVIEW OF SYSTEMS  --------------------------------------------------------------------------------  Gen: No fevers/chills  Skin: No rashes  Head/Eyes/Ears: Normal hearing,   Respiratory: No dyspnea, cough  CV: No chest pain  GI: No abdominal pain, diarrhea  : No dysuria, hematuria  MSK: No  edema  Heme: No easy bruising or bleeding  Psych: No significant depression    All other systems were reviewed and are negative, except as noted.    VITALS/PHYSICAL EXAM  --------------------------------------------------------------------------------  T(C): 36.6 (09-07-19 @ 06:58), Max: 36.9 (09-06-19 @ 14:14)  HR: 73 (09-07-19 @ 13:34) (72 - 92)  BP: 145/84 (09-07-19 @ 13:27) (109/66 - 177/105)  RR: 17 (09-06-19 @ 17:34) (17 - 17)  SpO2: 100% (09-07-19 @ 02:17) (95% - 100%)  Wt(kg): --        09-06-19 @ 07:01  -  09-07-19 @ 07:00  --------------------------------------------------------  IN: 1000 mL / OUT: 900 mL / NET: 100 mL      Physical Exam:  	Gen: NAD  	HEENT: MMM  	Pulm: CTA B/L  	CV: S1S2  	Abd: Soft, +BS, uterus gravidos  	Ext: Trace LE edema B/L  	Neuro: Awake  	Skin: Warm and dry    LABS/STUDIES  --------------------------------------------------------------------------------              9.7    6.94  >-----------<  233      [09-07-19 @ 09:00]              30.4     132  |  103  |  24  ----------------------------<  84      [09-07-19 @ 11:32]  7.3   |  19  |  1.01        Ca     7.9     [09-07-19 @ 11:32]      Mg     5.2     [09-07-19 @ 09:00]    TPro  5.7  /  Alb  2.8  /  TBili  0.4  /  DBili  x   /  AST  45  /  ALT  32  /  AlkPhos  124  [09-07-19 @ 11:32]    PT/INR: PT 10.4 , INR 0.94       [09-07-19 @ 09:00]  PTT: 29.0       [09-07-19 @ 09:00]    Uric acid 7.2      [09-07-19 @ 09:00]        [09-07-19 @ 09:00]    Creatinine Trend:  SCr 1.01 [09-07 @ 11:32]  SCr 1.01 [09-07 @ 09:00]  SCr 1.07 [09-07 @ 03:02]  SCr 1.01 [09-06 @ 19:00]  SCr 0.93 [09-04 @ 06:12]    Urinalysis - [08-31-19 @ 12:39]      Color YELLOW / Appearance Lt TURBID / SG 1.027 / pH 6.5      Gluc NEGATIVE / Ketone NEGATIVE  / Bili NEGATIVE / Urobili NORMAL       Blood SMALL / Protein 600 / Leuk Est NEGATIVE / Nitrite NEGATIVE      RBC 11-25 / WBC 6-10 / Hyaline 1+ / Gran  / Sq Epi MODERATE / Non Sq Epi  / Bacteria FEW        HBsAg NEGATIVE      [08-31-19 @ 18:10]    Syphilis Screen (Treponema Pallidum Ab) Negative      [08-31-19 @ 18:10]

## 2019-09-07 NOTE — OB NEONATOLOGY/PEDIATRICIAN DELIVERY SUMMARY - NSPEDSNEONOTESA_OBGYN_ALL_OB_FT
Mother is a 28 yo  at 31+5 weeks gestation. PMH unremarkable. PNC remarkable for history of cervical insufficiency on vaginal progesterone, IUGR (EFW 1.4kg @7th%), gestational hypertension with superimposed PEC. Labs A+, negative/NR/immune. GBS negative from . Mother was admitted since  due to severe range BPs, has difficulty maintaining BPs despite multiple IV medications, labetalol, Hydralazine, Procardia and on Mg drip. Due to electrolyte abnormalities inlcuding hyperkalemia peak 7.3 and hypermagnesemia peak 7.3, last level 4.7, elevated Creatinine 1.1 and uncontrolled BPs, [facility] recommended delivery. s/p BMZ -. No labor or rupture. AROM at delivery, clear fluid. Delivered vertex. Emerged crying. Delayed cord clamping for 30 secs. W/D/S/S. Went into secondary apnea after 2 mins of life and required PPV for 2 mins, 20/6 @60%, then transitioned to NCPAP, max 6/60%. APGAR 7/8. Voided in OR.  Transferred to NICU on CPAP 6/35%.  Mother wants breastfeeding, wants hepB.

## 2019-09-07 NOTE — CONSULT NOTE ADULT - ATTENDING COMMENTS
29 year old femlae  at 30.5 weeks GA who was sent from OB's private office for evaluation of elevated blood pressure in the office (158/88) and reports on sono today baby measuring SGA as per patient report.  Patient now S/P  on 19 with concern for post-operative sepsis in setting of hypothermia.      PLAN:    NEUROLOGY:  - Continue with Acetaminophen and Oxycodone PRN for pain control    RESPIRATORY:  - No active issues  - Saturating well on room air  - Maintain O2 saturation >92%    CARDIOVASCULAR:  - Previously on Procardia and Labetalol for hypertension  - Keep MAP >65    GI / NUTRITION:  - Regular diet    RENAL / GENITOURINARY:  - Hyperkalemic (last K 5.7 S/P hyperkalemia cocktail)  - Nephrology following patient  - Indwelling vuong catheter  - Continue to trend potassium  - Continue to monitor strict ins and outs q1 hour    HEMATOLOGIC:  - Continue to monitor CBC daily (concern for HELLP syndome)  - Platelets currently 181K    INFECTIOUS DISEASE:  - Hypothermic with no leukocytosis  - Blood cultures x2 and urine culture pending  - Continue with IV Invanz    ENDOCRINOLOGY:  - No active issues  - Continue to monitor glucose on BMP (goal 140-180)      Disposition: SICU    CCDX. hyperkalemia, HTN, oliguria.  The patient is a critical care patient with life threatening hemodynamic and metabolic instability in SICU.  I have personally interviewed when possible and examined the patient, reviewed data and laboratory tests/x-rays and all pertinent electronic images.  I was physically present for the key portions of the evaluation and management (E/M) service provided.   The SICU team has a constant risk benefit analyzes discussion with the primary team, all consultants, House Staff and PA's on all decisions.  These diagnoses are unrelated to the surgical procedure noted above.  I meet with family if needed to get further history, discuss the case and make care decisions for this patient who might not be able to participate.  Time involved in performance of separately billable procedures was not counted toward my critical care time. There is no overlap.  I spent 55-75 minutes ( 0800Hrs-0915Hrs in AM/ 1600Hrs-1715Hrs in PM, or other time indicated) of critical care time for the diagnoses, assessment, plan and interventions.  This time excludes time spent on separate procedures and teaching.
K today 5.1.   hyperkalemia is multifactorial; FARIHA secondary to Pre-eclampsia and vasoconstriction, Heparin and labetalol( renin inhibition), ? hemolysis( low Haptoglobin), and Hypermagnesemia which I think is the main culprit..... there are some case reports of both Hyperkalemia in patients receiving magnesium as well as  hyperkalemia( SICU team notified to communicate with OB colleagues to monitor K in the baby).  check Plasma K, blood smear for schistocytes given low haptoglobin,   continue with IV Lasix  DC Labetalol  DC LR

## 2019-09-07 NOTE — PROGRESS NOTE ADULT - PROBLEM SELECTOR PLAN 1
1. Maternal well being  - CV: continue procardia 30mg XL and labetalol 400mg TID for BP control   - Resp: saturating well  - GI: NPO  - Heme: holding HSQ for DVT ppx, bi-weekly HELLP labs - repeat set sent was wnl.   - OB: vaginal progesterone for cervical insufficiency  - FEN: LR@50  - Neuro: Mg for seizure prophylaxis.     2. Fetal well being  - NST BID  - BPP Tues/Fri  - s/p BMZ for fetal lung maturity  - PNV  - GBS negative  - plan for IOL at 34 weeks unless BPs continue to not be well controlled     TLal PGY3

## 2019-09-07 NOTE — CONSULT NOTE ADULT - ASSESSMENT
Patient is a 29 year old femlae  at 30.5 weeks GA who was sent from OB's private office for evaluation of elevated blood pressure in the office (158/88) and reports on sono today baby measuring SGA as per patient report.  Patient now S/P  on 19 with concern for post-operative sepsis in setting of hypothermia.      PLAN:    NEUROLOGY:  - Continue with Acetaminophen and Oxycodone PRN for pain control    RESPIRATORY:  - No active issues  - Saturating well on room air  - Maintain O2 saturation >92%    CARDIOVASCULAR:  - Previously on Procardia and Labetalol for hypertension  - Keep MAP >65    GI / NUTRITION:  - Regular diet    RENAL / GENITOURINARY:  - Hyperkalemic (last K 5.7 S/P hyperkalemia cocktail)  - Nephrology following patient  - Indwelling vuong catheter  - Continue to trend potassium  - Continue to monitor strict ins and outs q1 hour    HEMATOLOGIC:  - Continue to monitor CBC daily (concern for HELLP syndome)  - Platelets currently 181K    INFECTIOUS DISEASE:  - Hypothermic with no leukocytosis  - Blood cultures x2 and urine culture pending  - Continue with IV Invanz    ENDOCRINOLOGY:  - No active issues  - Continue to monitor glucose on BMP (goal 140-180)      Disposition: SICU    --------------------------------------------------------------------------------------

## 2019-09-07 NOTE — CONSULT NOTE ADULT - SUBJECTIVE AND OBJECTIVE BOX
SICU Consultation Note  =====================================================    Surgery Information:  S/P  19  EBL: 700mL  IV Fluids: 1300mL	  Blood Products: 0	  Urine Output: 200mL  Complications: Post-operative hypothermia      HPI:  Patient is a 29 year old femlae  at 30.5 weeks GA who was sent from OB's private office for evaluation of elevated blood pressure in the office (158/88) and reports on sono today baby measuring SGA as per patient report.       ALLERGIES:  shellfish (Hives)      PAST MEDICAL & SURGICAL HISTORY:  No pertinent past medical history  No significant past surgical history    --------------------------------------------------------------------------------------    CURRENT MEDICATIONS:     Neurologic Medications  acetaminophen   Tablet .. 975 milliGRAM(s) Oral every 6 hours  acetaminophen  IVPB .. 1000 milliGRAM(s) IV Intermittent every 6 hours  diphenhydrAMINE 25 milliGRAM(s) Oral every 6 hours PRN Itching  levETIRAcetam  IVPB 500 milliGRAM(s) IV Intermittent every 12 hours  oxyCODONE    IR 5 milliGRAM(s) Oral every 3 hours PRN Moderate to Severe Pain (4-10)  oxyCODONE    IR 5 milliGRAM(s) Oral once PRN Moderate to Severe Pain (4-10)    Gastrointestinal Medications  dextrose 5%. 1000 milliLiter(s) IV Continuous <Continuous>  docusate sodium 100 milliGRAM(s) Oral two times a day PRN Stool softening  glycerin Suppository - Adult 1 Suppository(s) Rectal at bedtime PRN Constipation  lactated ringers. 1000 milliLiter(s) IV Continuous <Continuous>  magnesium hydroxide Suspension 30 milliLiter(s) Oral two times a day PRN Constipation  simethicone 80 milliGRAM(s) Chew every 4 hours PRN Gas    Genitourinary Medications  oxytocin Infusion 333.333 milliUNIT(s)/Min IV Continuous <Continuous>  oxytocin Infusion 333.333 milliUNIT(s)/Min IV Continuous <Continuous>    Hematologic/Oncologic Medications  diphtheria/tetanus/pertussis (acellular) Vaccine (ADAcel) 0.5 milliLiter(s) IntraMuscular once  heparin  Injectable 5000 Unit(s) SubCutaneous every 12 hours    Antimicrobial/Immunologic Medications  ertapenem  IVPB 1000 milliGRAM(s) IV Intermittent every 24 hours    Topical/Other Medications  lanolin Ointment 1 Application(s) Topical every 6 hours PRN Sore Nipples    --------------------------------------------------------------------------------------    VITAL SIGNS:  T(C): 34.4 (07 Sep 2019 20:30), Max: 36.6 (07 Sep 2019 06:58)  T(F): 93.9 (07 Sep 2019 20:30), Max: 97.88 (07 Sep 2019 06:58)  HR: 73 (07 Sep 2019 20:30) (59 - 87)  BP: 133/100 (07 Sep 2019 20:30) (109/66 - 155/89)  BP(mean): 109 (07 Sep 2019 20:30) (78 - 109)  RR: 19 (07 Sep 2019 20:30) (8 - 21)  SpO2: 99% (07 Sep 2019 20:30) (95% - 100%)    --------------------------------------------------------------------------------------    INS AND OUTS:    06 Sep 2019 07:01  -  07 Sep 2019 07:00  --------------------------------------------------------  IN:    Lactated Ringers IV Bolus: 350 mL    lactated ringers.: 300 mL    magnesium sulfate  Infusion: 350 mL  Total IN: 1000 mL    OUT:    Voided: 900 mL  Total OUT: 900 mL    Total NET: 100 mL      07 Sep 2019 07:01  -  07 Sep 2019 22:00  --------------------------------------------------------  IN:    Other: 1300 mL  Total IN: 1300 mL    OUT:    Estimated Blood Loss: 700 mL    Indwelling Catheter - Urethral: 1210 mL    Other: 200 mL  Total OUT: 2110 mL    Total NET: -810 mL    --------------------------------------------------------------------------------------    EXAM    NEUROLOGY  Exam: Normal, NAD, alert, oriented x4.  No focal deficits.    HEENT  Exam: Normocephalic, atraumatic.    RESPIRATORY  Exam: Lungs clear to auscultation, Normal expansion / effort.    CARDIOVASCULAR  Exam: S1, S2.  Regular rate and rhythm.    GI/NUTRITION  Exam: Abdomen soft.  Mildly distended.  Appropriate incisional tenderness.  Incision clean, dry and intact.  Current Diet: Regular    VASCULAR  Exam: Extremities warm, pink, well-perfused.    MUSCULOSKELETAL  Exam: All extremities moving spontaneously without limitations.    SKIN:  Exam: Good skin turgor, no skin breakdown.      METABOLIC/FLUIDS/ELECTROLYTES  dextrose 5%. 1000 milliLiter(s) IV Continuous <Continuous>  lactated ringers. 1000 milliLiter(s) IV Continuous <Continuous>    HEMATOLOGIC  [x] DVT Prophylaxis: heparin  Injectable 5000 Unit(s) SubCutaneous every 12 hours    INFECTIOUS DISEASE  Antimicrobials/Immunologic Medications:  diphtheria/tetanus/pertussis (acellular) Vaccine (ADAcel) 0.5 milliLiter(s) IntraMuscular once  ertapenem  IVPB 1000 milliGRAM(s) IV Intermittent every 24 hours      TUBES / LINES / DRAINS:  [x] Peripheral IV  [] Central Venous Line     	[] R	[] L	[] IJ	[] Fem	[] SC	Date Placed:   [] Arterial Line		[] R	[] L	[] Fem	[] Rad	[] Ax	Date Placed:   [] PICC:         	[] Midline		[] Mediport  [x] Urinary Catheter		Date Placed:       --------------------------------------------------------------------------------------    LABS    CBC:                      10.0   6.48  )-----------( 181      ( 07 Sep 2019 15:56 )             33.3     CHEM:  132<L>  |  102  |  27<H>  ----------------------------<  95  5.7<H>   |  21<L>  |  1.12    Ca    8.5      07 Sep 2019 19:30  Phos  6.0       Mg     4.1         TPro  5.8<L>  /  Alb  2.8<L>  /  TBili  0.3  /  DBili  x   /  AST  44<H>  /  ALT  33  /  AlkPhos  122<H>      --------------------------------------------------------------------------------------

## 2019-09-07 NOTE — PROGRESS NOTE ADULT - SUBJECTIVE AND OBJECTIVE BOX
Ms. Warren was brought down to L&D last night for multiple severe range BPs requiring I/V push of 20/40 mg of Labetalol and 5 mg of Hydralazine. Her PO Labetalol dose was increased to 400 mg tid from 200 mg tid.   She feels well without headache or vision changes. Denies vaginal bleeding or abdominal pain. No shortness of breath or chest pain.    -147/84-96, p 72, UOP since 0400 300 cc recorded    AA0X3  Abd: soft, non tender  No calf tenderness  FHT: 120-130 moderate scar, + accels, no decels  [place]: intermittent ctx q 2-5 min    Labs: 0900: H/H 9.7/30.4, Fibrinogen: 548  AST/ALT: 42/32  Cr: stable at 1.01  Uric acid: 7.2  Electrolytes: K: 6.2, Ca 8, Mg 5.2, Na; 132 Ms. Warren was brought down to L&D last night for multiple severe range BPs requiring I/V push of 20/40 mg of Labetalol and 5 mg of Hydralazine. Her PO Labetalol dose was increased to 400 mg tid from 200 mg tid.   She feels well without headache or vision changes. Denies vaginal bleeding or abdominal pain. No shortness of breath, palpitations or chest pain. Denies RUQ or epigastric pain, nausea or vomiting. Denies contractions, LOF or VB and reports active fetal movement.     Magnesium was started overnight, decreased to 1.5g/hour, stopped at 3am after elevated magnesium level 7.2. Restarted at 1g/hour at 8:30am.     -147/84-96 mmHg, p 72, UOP since 0400 300cc recorded, voiding spontaneously, appropriate UOP over night    AA0X3, NAD  Abd: soft, non tender, gravid  No calf tenderness  FHT: 120-130 moderate scar, + accels, no decels  [place]: intermittent ctx q 2-5 min    Labs: 0900: H/H 9.7/30.4, Fibrinogen: 548  AST/ALT: 42/32  Cr: stable at 1.01  Uric acid: 7.2  Electrolytes: K: 6.2, Ca 8, Mg 5.2, Na; 132

## 2019-09-07 NOTE — PROGRESS NOTE ADULT - SUBJECTIVE AND OBJECTIVE BOX
R3 Antepartum Progress Note - HD#8    Interval events: over the course of the evening pt noted to have multiple severe range BPs requiring IV pushes of medication and changes to her po medication.  Overall pt required IVP labetalol 20, 40 mg and hydralazine 10 mg. Her po dose of labetalol was increased from 200TID to 400 TID. MgSO4 was started at that time.  M Fellow Dr Gil was contacted who recommended the oral medication increase and recommended continuous monitoring, NPO, and BP monitoring.   Pt has always been asymptomatic.     Pt noted to be domenic on the toco, denies feeling any ctx.      Subjective  Currrently no acute complaints. denies headache, chest pain, shortness of breath, epigastric and RUQ pain   Reports good FM and denies CTX, LOF, VB.      Objective  Vital Signs Last 24 Hrs  T(C): 36.9 (06 Sep 2019 17:34), Max: 36.9 (06 Sep 2019 14:14)  T(F): 98.4 (06 Sep 2019 17:34), Max: 98.5 (06 Sep 2019 14:14)  HR: 87 (06 Sep 2019 19:20) (73 - 98)  BP: 136/83 (06 Sep 2019 19:20) (122/65 - 177/105)  BP(mean): --  RR: 17 (06 Sep 2019 17:34) (16 - 17)  SpO2: 98% (06 Sep 2019 19:24) (96% - 100%)    Physical Exam:  General: NAD  CV: RRR, no murmurs, S1, S2  Resp: CTA-B, symmetrical expansion  Abdomen: Soft, non-tender, non-distended, gravid  SVE: 0 (ext os open 2cm, internal closed)/70/-3  Abd US: cephalic  Ext: no edema/tenderness in LE b/l    Labs:    Blood Type: A Positive  Antibody Screen: --               8.0    5.97  )-----------( 203      ( 09-04 @ 06:12 )             26.9     09-04-19 @ 06:12    136  |  106  |  18             --------------------------< 79     4.5  |  20<L>  | 0.93    eGFR AA: 96  eGFR N-AA: 83    Calcium: 8.1<L>  Phosphorus: --  Magnesium: --    AST: 27    ALT: 23  AlkPhos: 109  Protein: 5.3<L>  Albumin: 2.8<L>  TBili: 0.3  D-Bili: --    MEDICATIONS  (STANDING):  heparin  Injectable 5000 Unit(s) SubCutaneous every 12 hours  labetalol 200 milliGRAM(s) Oral three times a day  NIFEdipine XL 60 milliGRAM(s) Oral daily  oxytocin Infusion 333.333 milliUNIT(s)/Min (1000 mL/Hr) IV Continuous <Continuous>  prenatal multivitamin 1 Tablet(s) Oral daily  progesterone Vaginal Insert 100 milliGRAM(s) Vaginal at bedtime  sodium chloride 0.9% lock flush 3 milliLiter(s) IV Push every 8 hours

## 2019-09-07 NOTE — PROVIDER CONTACT NOTE (CRITICAL VALUE NOTIFICATION) - BACKGROUND
Pt presents w/severe range BP 9/6 @ 18:00 . Received x1 dose of Labetalol 20 IVP, X1 dose of Labetalol 40 IVP. x1 dose of Hydralazine 10 IVP. Also received PO antihypertensives. Pt started on Mag @19:00

## 2019-09-07 NOTE — PROGRESS NOTE ADULT - SUBJECTIVE AND OBJECTIVE BOX
Called to hospital to manage and deliver pt with K 7.3 this am. Patient with no acute complaints. Pt reports good fetal movement.  Denies leakage of fluid, contractions, vaginal bleeding.   Denies HA, epigastric pain, blurred vision, CP, SOB, N/V, fevers, and chills.  MFM and  nephrology  already consulted and pt given Calgluconate,   D5W,  and insulin push as recommended.  As per nephrology fellow Dr Radha brower 40mg iv push given now. K at 13:00 6.7     Vital Signs Last 24 Hours  T(C): 36.6 (09-07-19 @ 06:58), Max: 36.9 (09-06-19 @ 17:34)  HR: 83 (09-07-19 @ 14:18) (72 - 92)  BP: 151/96 (09-07-19 @ 14:18) (109/66 - 177/105)  RR: 17 (09-06-19 @ 17:34) (17 - 17)  SpO2: 100% (09-07-19 @ 02:17) (95% - 100%)    Physical Exam:  General: NAD  Abdomen: Soft, non-tender, gravid  Ext: No pain or swelling    Labs:             9.7    6.94  )-----------( 233      ( 09-07 @ 09:00 )             30.4     09-07 @ 13:20    131  |  101  |  24  ----------------------------<  86  6.7   |  21  |  1.15    Ca    7.7      09-07 @ 13:20  Phos  5.3     09-07 @ 13:20  Mg     5.3     09-07 @ 13:20    TPro  5.9  /  Alb  3.0  /  TBili  0.5  /  DBili  x   /  AST  45  /  ALT  31  /  AlkPhos  124  09-07 @ 13:20    PT/INR - ( 09-07 @ 09:00 )   PT: 10.4 SEC;   INR: 0.94     PTT - ( 09-07 @ 09:00 )  PTT:29.0 SEC    Uric Acid: (09-07 @ 09:00)  7.2      Fibrinogen: (09-07 @ 09:00)  548.4    LDH: (09-07 @ 09:00)  336        MEDICATIONS  (STANDING):  dextrose 5%. 1000 milliLiter(s) (50 mL/Hr) IV Continuous <Continuous>  furosemide   Injectable 40 milliGRAM(s) IV Push once  labetalol 400 milliGRAM(s) Oral three times a day  magnesium sulfate Infusion 1 Gm/Hr (25 mL/Hr) IV Continuous <Continuous>  NIFEdipine XL 60 milliGRAM(s) Oral daily  oxytocin Infusion 333.333 milliUNIT(s)/Min (1000 mL/Hr) IV Continuous <Continuous>  prenatal multivitamin 1 Tablet(s) Oral daily  progesterone Vaginal Insert 100 milliGRAM(s) Vaginal at bedtime  sodium chloride 0.9% lock flush 3 milliLiter(s) IV Push every 8 hours  sodium chloride 0.9%. 1000 milliLiter(s) (100 mL/Hr) IV Continuous <Continuous>  sodium polystyrene sulfonate Suspension 30 Gram(s) Oral three times a day    MEDICATIONS  (PRN):

## 2019-09-07 NOTE — PROGRESS NOTE ADULT - ASSESSMENT
28 yo  31w4d a/w sPEC requiring multiple IV pushes of antihypertensives throughout the hospital course and most recently over the shift (lab , hydral 10) Antepartum course complicated by new diagnosis of IUGR (7% on sono 9/3) and cervical insufficiency with no acute changes upon admission. 30 yo  31w5d a/w sPEC requiring multiple IV pushes of antihypertensives throughout the hospital course and most recently over the shift (lab , hydral 10) Antepartum course complicated by new diagnosis of IUGR (7% on sono 9/3) and cervical insufficiency with no acute changes upon admission.

## 2019-09-07 NOTE — CONSULT NOTE ADULT - PROBLEM SELECTOR RECOMMENDATION 9
in the setting of increase dietary potassium intake, LR use. Serum potassium elevated at 5.9 on 9/6/19 and further rise at 7.3 on 9/7/19. EKG with mild peaked T waves. Hemolysis needs to be rule out, sent haptoglobin. ? HELLP. Etiology of hyperkalemia unclear. Pt. non-oliguric, received insulin and calcium gluconate. Would give trial of lasix 40 mg IV BID and kayexalate 30 g TID. Change IVF to NS 0.9%. Repeat serum potassium after medical management. Monitor serum potassium    Case discussed with Dr Reece in the setting of increase dietary potassium intake, LR use. Serum potassium elevated at 5.9 on 9/6/19 and further rise at 7.3 on 9/7/19. EKG with mild peaked T waves. Hemolysis needs to be rule out, sent haptoglobin. Check urine electrolytes (Na, K, Cl, urea, Osm, creatinine) ? HELLP. Etiology of hyperkalemia unclear. Pt. non-oliguric, received insulin and calcium gluconate. Would give trial of lasix 40 mg IV BID and kayexalate 30 g TID. Change IVF to NS 0.9%. Repeat serum potassium after medical management. Monitor serum potassium    Case discussed with Dr Reece in the setting of increase dietary potassium intake, LR use. Serum potassium elevated at 5.9 on 9/6/19 and further rise at 7.3 on 9/7/19. EKG with mild peaked T waves. Hemolysis needs to be rule out, sent haptoglobin. Check urine electrolytes (Na, K, Cl, urea, Osm, creatinine) . Etiology of hyperkalemia unclear. Pt. non-oliguric, received insulin and calcium gluconate. Would give trial of lasix 40 mg IV BID and kayexalate 30 g TID. Change IVF to NS 0.9%. Repeat serum potassium after medical management. Monitor serum potassium    Case discussed with Dr Reece

## 2019-09-07 NOTE — OB PROVIDER DELIVERY SUMMARY - NSPROVIDERDELIVERYNOTE_OBGYN_ALL_OB_FT
liveborn female infant delivered from vtx presentation, apgars 7/8  grossly nl uterus, tubes and ovaries b/l  ebl 700  ivf 1300  uop 200

## 2019-09-07 NOTE — CONSULT NOTE ADULT - ASSESSMENT
28 yo  with no past Med-surg hx admitted for pre-eclampsia. MICU consulted for hyperkalemia.    #Hyperkalemia  - Suspect this is driven secondary to pregnancy and HELP/pre-eclampsia. Would recommending delivery at this time.  - Would consult nephrology for management.  - Medically management of hyperkalemia with calcium gluconate, Insulin IV push, and D50 pushes.   - Repeat BMP and EKG after management  - MICU will continue to follow. Please reach out for an concerns at [phone].      Attending recs to follow.      Mayte Oakley  PGY-2 Internal Medicine  Pager: [phone] (NS)/[phone] (DORA) 28 yo  with no past Med-surg hx admitted for pre-eclampsia. MICU consulted for hyperkalemia.    #Hyperkalemia  s/p calciuim gluconate, D5, and insulin push.  - Suspect this is driven secondary to pregnancy and HELP/pre-eclampsia. Would recommending delivery at this time.  - Would consult nephrology for management.  - Medically management of hyperkalemia with calcium gluconate, Insulin IV push, and D50 pushes.   - Repeat BMP and EKG after management  - MICU will continue to follow. Please reach out for an concerns at [phone].      Attending recs to follow.      Mayte Oakley  PGY-2 Internal Medicine  Pager: [phone] (NS)/[phone] ([place]) 30 yo  with no past Med-surg hx admitted for pre-eclampsia. MICU consulted for hyperkalemia.    #Hyperkalemia  s/p calciuim gluconate, D5, and insulin push.  - Suspect this is driven secondary to pregnancy and HELLP/pre-eclampsia. Would recommend delivery at this time.  - Would consult nephrology for management.  - Medically management of hyperkalemia with calcium gluconate, Insulin IV push, and D50 pushes.   - Repeat BMP and EKG after management  - MICU will continue to follow. Please reach out for an concerns at [phone].      Attending recs to follow.      Mayte Oakley  PGY-2 Internal Medicine  Pager: [phone] (NS)/[phone] ([place]HE[place])

## 2019-09-07 NOTE — CONSULT NOTE ADULT - SUBJECTIVE AND OBJECTIVE BOX
Full note to follow. CHIEF COMPLAINT: Hyperkalemia    HPI:     at 31w who was sent to the ED after being noted to have a hypertension at a routine OB appointment. At  that time, patient sates she was c/o of a headache. She was admitted under the OB service for concern of pre-eclampsia. She was treated with     PAST MEDICAL & SURGICAL HISTORY:  No pertinent past medical history  No significant past surgical history      FAMILY HISTORY:      SOCIAL HISTORY:  Smoking: __ packs x ___ years  EtOH Use:  Marital Status:  Occupation:  Recent Travel:  Country of Birth:  Advance Directives:    Allergies    No Known Drug Allergies  shellfish (Hives)    Intolerances        HOME MEDICATIONS:    REVIEW OF SYSTEMS:  Constitutional:   Eyes:  ENT:  CV:  Resp:  GI:  :  MSK:  Integumentary:  Neurological:  Psychiatric:  Endocrine:  Hematologic/Lymphatic:  Allergic/Immunologic:  [ ] All other systems negative  [ ] Unable to assess ROS because ________    OBJECTIVE:  ICU Vital Signs Last 24 Hrs  T(C): 36.6 (07 Sep 2019 06:58), Max: 36.9 (06 Sep 2019 17:34)  T(F): 97.88 (07 Sep 2019 06:58), Max: 98.4 (06 Sep 2019 17:34)  HR: 81 (07 Sep 2019 14:48) (72 - 92)  BP: 131/82 (07 Sep 2019 14:48) (109/66 - 177/105)  BP(mean): --  ABP: --  ABP(mean): --  RR: 17 (06 Sep 2019 17:34) (17 - 17)  SpO2: 100% (07 Sep 2019 02:17) (95% - 100%)         @ 07:01  -   @ 07:00  --------------------------------------------------------  IN: 1000 mL / OUT: 900 mL / NET: 100 mL      CAPILLARY BLOOD GLUCOSE          PHYSICAL EXAM:  General:   HEENT:   Lymph Nodes:  Neck:   Respiratory:   Cardiovascular:   Abdomen:   Extremities:   Skin:   Neurological:  Psychiatry:    HOSPITAL MEDICATIONS:  MEDICATIONS  (STANDING):  dextrose 5%. 1000 milliLiter(s) (50 mL/Hr) IV Continuous <Continuous>  labetalol 400 milliGRAM(s) Oral three times a day  magnesium sulfate Infusion 1 Gm/Hr (25 mL/Hr) IV Continuous <Continuous>  NIFEdipine XL 60 milliGRAM(s) Oral daily  oxytocin Infusion 333.333 milliUNIT(s)/Min (1000 mL/Hr) IV Continuous <Continuous>  prenatal multivitamin 1 Tablet(s) Oral daily  progesterone Vaginal Insert 100 milliGRAM(s) Vaginal at bedtime  sodium chloride 0.9% lock flush 3 milliLiter(s) IV Push every 8 hours  sodium chloride 0.9%. 1000 milliLiter(s) (100 mL/Hr) IV Continuous <Continuous>  sodium polystyrene sulfonate Suspension 30 Gram(s) Oral three times a day    MEDICATIONS  (PRN):      LABS:                        9.7    6.94  )-----------( 233      ( 07 Sep 2019 09:00 )             30.4         131<L>  |  101  |  24<H>  ----------------------------<  86  6.7<HH>   |  21<L>  |  1.15    Ca    7.7<L>      07 Sep 2019 13:20  Phos  5.3       Mg     5.3         TPro  5.9<L>  /  Alb  3.0<L>  /  TBili  0.5  /  DBili  x   /  AST  45<H>  /  ALT  31  /  AlkPhos  124<H>      PT/INR - ( 07 Sep 2019 09:00 )   PT: 10.4 SEC;   INR: 0.94          PTT - ( 07 Sep 2019 09:00 )  PTT:29.0 SEC          MICROBIOLOGY:     RADIOLOGY:  [ ] Reviewed and interpreted by me    EKG: CHIEF COMPLAINT: Hyperkalemia    HPI:     at 31w who was sent to the ED after being noted to have a hypertension at a routine OB appointment. At  that time, patient sates she was c/o of a headache. She was admitted under the OB service for concern of pre-eclampsia. She was treated with magnesium, labetalol 200 mg TID, and nifedipine 30 mg XL. MICU was consulted with a potassium of 7.3    PAST MEDICAL & SURGICAL HISTORY:  No pertinent past medical history  No significant past surgical history      FAMILY HISTORY:  Mother - HTN    SOCIAL HISTORY:  Lives at home with   Denies smoking, EtOH, and IVDU    Allergies    No Known Drug Allergies  shellfish (Hives)    Intolerances        HOME MEDICATIONS:  Labetalol      REVIEW OF SYSTEMS:    CONSTITUTIONAL: +Weakness No fevers or chills  EYES/ENT: No visual changes;  No vertigo or throat pain   NECK: No pain or stiffness  RESPIRATORY: No cough, wheezing, hemoptysis; No shortness of breath  CARDIOVASCULAR: No chest pain or palpitations  GASTROINTESTINAL: No abdominal or epigastric pain. No nausea, vomiting, or hematemesis; No diarrhea or constipation. No melena or hematochezia.  GENITOURINARY: No dysuria, frequency or hematuria  NEUROLOGICAL: +headaches No numbness or weakness  SKIN: No itching, rashes      OBJECTIVE:  ICU Vital Signs Last 24 Hrs  T(C): 36.6 (07 Sep 2019 06:58), Max: 36.9 (06 Sep 2019 17:34)  T(F): 97.88 (07 Sep 2019 06:58), Max: 98.4 (06 Sep 2019 17:34)  HR: 81 (07 Sep 2019 14:48) (72 - 92)  BP: 131/82 (07 Sep 2019 14:48) (109/66 - 177/105)  BP(mean): --  ABP: --  ABP(mean): --  RR: 17 (06 Sep 2019 17:34) (17 - 17)  SpO2: 100% (07 Sep 2019 02:17) (95% - 100%)         @ 07:01  -   @ 07:00  --------------------------------------------------------  IN: 1000 mL / OUT: 900 mL / NET: 100 mL      CAPILLARY BLOOD GLUCOSE      PHYSICAL EXAM:  GENERAL: NAD, well-developed, resting comfortably  HEAD:  Atraumatic, Normocephalic  EYES: EOMI, PERRLA, conjunctiva and sclera clear  NECK: Supple, No JVD  CHEST/LUNG: Clear to auscultation bilaterally; No wheeze  HEART: Regular rate and rhythm; No murmurs, rubs, or gallops  ABDOMEN: +Gravid abdomen, NTTP  EXTREMITIES:  2+ Peripheral Pulses, No clubbing, cyanosis, or edema  PSYCH: AAOx3  NEUROLOGY: non-focal  SKIN: No rashes or lesions        HOSPITAL MEDICATIONS:  MEDICATIONS  (STANDING):  dextrose 5%. 1000 milliLiter(s) (50 mL/Hr) IV Continuous <Continuous>  labetalol 400 milliGRAM(s) Oral three times a day  magnesium sulfate Infusion 1 Gm/Hr (25 mL/Hr) IV Continuous <Continuous>  NIFEdipine XL 60 milliGRAM(s) Oral daily  oxytocin Infusion 333.333 milliUNIT(s)/Min (1000 mL/Hr) IV Continuous <Continuous>  prenatal multivitamin 1 Tablet(s) Oral daily  progesterone Vaginal Insert 100 milliGRAM(s) Vaginal at bedtime  sodium chloride 0.9% lock flush 3 milliLiter(s) IV Push every 8 hours  sodium chloride 0.9%. 1000 milliLiter(s) (100 mL/Hr) IV Continuous <Continuous>  sodium polystyrene sulfonate Suspension 30 Gram(s) Oral three times a day    MEDICATIONS  (PRN):      LABS:                        9.7    6.94  )-----------( 233      ( 07 Sep 2019 09:00 )             30.4         131<L>  |  101  |  24<H>  ----------------------------<  86  6.7<HH>   |  21<L>  |  1.15    Ca    7.7<L>      07 Sep 2019 13:20  Phos  5.3       Mg     5.3         TPro  5.9<L>  /  Alb  3.0<L>  /  TBili  0.5  /  DBili  x   /  AST  45<H>  /  ALT  31  /  AlkPhos  124<H>      PT/INR - ( 07 Sep 2019 09:00 )   PT: 10.4 SEC;   INR: 0.94          PTT - ( 07 Sep 2019 09:00 )  PTT:29.0 SEC          MICROBIOLOGY:     RADIOLOGY:  [ ] Reviewed and interpreted by me    EKG:

## 2019-09-07 NOTE — CHART NOTE - NSCHARTNOTEFT_GEN_A_CORE
Patient is 29y  status post C/S for sPEC.     Pt seen and examined at bedside given hypothermic to 34.2. Pt admits to chills but denies other complaints. Denies cp/sob, dizziness/lightheadedness, nausea/vomiting. Denies HA, blurry vision, RUQ/epigastric pain, new onset swelling. UOP since OR adequate.    Vital Signs Last 24 Hrs  T(C): 34.4 (07 Sep 2019 20:30), Max: 36.6 (07 Sep 2019 06:58)  T(F): 93.9 (07 Sep 2019 20:30), Max: 97.88 (07 Sep 2019 06:58)  HR: 73 (07 Sep 2019 20:30) (59 - 90)  BP: 133/100 (07 Sep 2019 20:30) (109/66 - 155/89)  BP(mean): 109 (07 Sep 2019 20:30) (78 - 109)  RR: 19 (07 Sep 2019 20:30) (8 - 21)  SpO2: 99% (07 Sep 2019 20:30) (95% - 100%)    I&O's Detail    06 Sep 2019 07:01  -  07 Sep 2019 07:00  --------------------------------------------------------  IN:    Lactated Ringers IV Bolus: 350 mL    lactated ringers.: 300 mL    magnesium sulfate  Infusion: 350 mL  Total IN: 1000 mL    OUT:    Voided: 900 mL  Total OUT: 900 mL    Total NET: 100 mL      07 Sep 2019 07:01  -  07 Sep 2019 21:10  --------------------------------------------------------  IN:    Other: 1300 mL  Total IN: 1300 mL    OUT:    Estimated Blood Loss: 700 mL    Indwelling Catheter - Urethral: 1210 mL    Other: 200 mL  Total OUT: 2110 mL    Total NET: -810 mL          PE:  Gen: Appears comfortable  CV: RR s1s2  Pulm: CTA b/l  Abd: Fundus firm and midline  Pelvic: Lochia wnl   Ext: NT b/l    Labs:                        10.0   6.48  )-----------( 181      ( 07 Sep 2019 15:56 )             33.3                         9.7    6.94  )-----------( 233      ( 07 Sep 2019 09:00 )             30.4                         10.1   7.11  )-----------( 240      ( 07 Sep 2019 03:02 )             32.9                         8.7    5.53  )-----------( 219      ( 06 Sep 2019 19:00 )             27.7     09-07    132<L>  |  102  |  27<H>  ----------------------------<  95  5.7<H>   |  21<L>  |  1.12    Ca    8.5      07 Sep 2019 19:30  Phos  6.0     09-07  Mg     4.1     09-07    TPro  5.8<L>  /  Alb  2.8<L>  /  TBili  0.3  /  DBili  x   /  AST  44<H>  /  ALT  33  /  AlkPhos  122<H>  09-07    PT/INR - ( 07 Sep 2019 09:00 )   PT: 10.4 SEC;   INR: 0.94          PTT - ( 07 Sep 2019 09:00 )  PTT:29.0 SEC    Fibrinogen: Fibrinogen Assay: 548.4 mg/dL (09-07 @ 09:00)  Fibrinogen Assay: 523.8 mg/dL (09-07 @ 03:02)      Lactate:     MEDICATIONS  (STANDING):  acetaminophen   Tablet .. 975 milliGRAM(s) Oral every 6 hours  acetaminophen  IVPB .. 1000 milliGRAM(s) IV Intermittent every 6 hours  dextrose 5%. 1000 milliLiter(s) (1000 mL/Hr) IV Continuous <Continuous>  diphtheria/tetanus/pertussis (acellular) Vaccine (ADAcel) 0.5 milliLiter(s) IntraMuscular once  ertapenem  IVPB 1000 milliGRAM(s) IV Intermittent every 24 hours  heparin  Injectable 5000 Unit(s) SubCutaneous every 12 hours  lactated ringers. 1000 milliLiter(s) (125 mL/Hr) IV Continuous <Continuous>  levETIRAcetam  IVPB 500 milliGRAM(s) IV Intermittent every 12 hours  oxytocin Infusion 333.333 milliUNIT(s)/Min (1000 mL/Hr) IV Continuous <Continuous>  oxytocin Infusion 333.333 milliUNIT(s)/Min (1000 mL/Hr) IV Continuous <Continuous>      Assessment: 30yo status post pLTCS for sPEC now with hypothermia to 34.2 and concern for sepsis. Pt currently without tachycardia or hypotension however given hypothermia on rpt temp will initiate sepsis workup. Pt discussed with MICU and SICU consult is pending.       Plan:  -warming blanket in place, IV fluids warmed   -CBC with diff, lacatate, bx  x2, ucx pending  -Invanz for treament of possible sepsis  -Keppra for seziure ppx in setting of sPEC. Given electrolyte abnormalities avoiding magnesium.   -BMP demonstrates improvement or in prior electrolyte abnormalities, K 5.7, Ca wnl.   -SICU consulted, recs pending. MICU defers to SICU at this time given pt in postop.       d/w Dr. Rose and Dr. Naresh Connor PGY3  -------------------------------------------------------------------------------------------------------------

## 2019-09-07 NOTE — PROGRESS NOTE ADULT - ASSESSMENT
A IUP 31 5/7 wks  Preeclampsia with severe features  hyperkalemia    P Kayexalate po     repeat K in 1 hr -CS if decreased     Continue fetal  monitoring, CS if distress

## 2019-09-07 NOTE — OB RN DELIVERY SUMMARY - NS_SEPSISRSKCALC_OBGYN_ALL_OB_FT
Unable to calculate the EOS score due to gestational age being less than 34 weeks or more than 43 weeks.

## 2019-09-07 NOTE — PROGRESS NOTE ADULT - REASON FOR ADMISSION
Preeclampsia with severe features and FGR Preeclampsia with severe features and FGR, now with worsening renal function and hyperkalemia

## 2019-09-08 DIAGNOSIS — Z98.890 OTHER SPECIFIED POSTPROCEDURAL STATES: ICD-10-CM

## 2019-09-08 LAB
ALBUMIN SERPL ELPH-MCNC: 2.5 G/DL — LOW (ref 3.3–5)
ALBUMIN SERPL ELPH-MCNC: 3 G/DL — LOW (ref 3.3–5)
ALP SERPL-CCNC: 103 U/L — SIGNIFICANT CHANGE UP (ref 40–120)
ALP SERPL-CCNC: 122 U/L — HIGH (ref 40–120)
ALT FLD-CCNC: 32 U/L — SIGNIFICANT CHANGE UP (ref 4–33)
ALT FLD-CCNC: 38 U/L — HIGH (ref 4–33)
ANION GAP SERPL CALC-SCNC: 12 MMO/L — SIGNIFICANT CHANGE UP (ref 7–14)
ANION GAP SERPL CALC-SCNC: 13 MMO/L — SIGNIFICANT CHANGE UP (ref 7–14)
APTT BLD: 26.5 SEC — LOW (ref 27.5–36.3)
APTT BLD: 26.9 SEC — LOW (ref 27.5–36.3)
AST SERPL-CCNC: 42 U/L — HIGH (ref 4–32)
AST SERPL-CCNC: 55 U/L — HIGH (ref 4–32)
BILIRUB DIRECT SERPL-MCNC: < 0.2 MG/DL — SIGNIFICANT CHANGE UP (ref 0.1–0.2)
BILIRUB SERPL-MCNC: 0.3 MG/DL — SIGNIFICANT CHANGE UP (ref 0.2–1.2)
BILIRUB SERPL-MCNC: 0.3 MG/DL — SIGNIFICANT CHANGE UP (ref 0.2–1.2)
BUN SERPL-MCNC: 30 MG/DL — HIGH (ref 7–23)
BUN SERPL-MCNC: 32 MG/DL — HIGH (ref 7–23)
CALCIUM SERPL-MCNC: 7.6 MG/DL — LOW (ref 8.4–10.5)
CALCIUM SERPL-MCNC: 8.9 MG/DL — SIGNIFICANT CHANGE UP (ref 8.4–10.5)
CHLORIDE SERPL-SCNC: 100 MMOL/L — SIGNIFICANT CHANGE UP (ref 98–107)
CHLORIDE SERPL-SCNC: 103 MMOL/L — SIGNIFICANT CHANGE UP (ref 98–107)
CHLORIDE UR-SCNC: < 20 MMOL/L — SIGNIFICANT CHANGE UP
CO2 SERPL-SCNC: 20 MMOL/L — LOW (ref 22–31)
CO2 SERPL-SCNC: 22 MMOL/L — SIGNIFICANT CHANGE UP (ref 22–31)
CREAT SERPL-MCNC: 1.21 MG/DL — SIGNIFICANT CHANGE UP (ref 0.5–1.3)
CREAT SERPL-MCNC: 1.24 MG/DL — SIGNIFICANT CHANGE UP (ref 0.5–1.3)
GLUCOSE BLDC GLUCOMTR-MCNC: 80 MG/DL — SIGNIFICANT CHANGE UP (ref 70–99)
GLUCOSE SERPL-MCNC: 85 MG/DL — SIGNIFICANT CHANGE UP (ref 70–99)
GLUCOSE SERPL-MCNC: 92 MG/DL — SIGNIFICANT CHANGE UP (ref 70–99)
HAPTOGLOB SERPL-MCNC: < 20 MG/DL — LOW (ref 34–200)
HAPTOGLOB SERPL-MCNC: < 20 MG/DL — LOW (ref 34–200)
HCT VFR BLD CALC: 26.3 % — LOW (ref 34.5–45)
HCT VFR BLD CALC: 33.1 % — LOW (ref 34.5–45)
HGB BLD-MCNC: 10.4 G/DL — LOW (ref 11.5–15.5)
HGB BLD-MCNC: 8.4 G/DL — LOW (ref 11.5–15.5)
INR BLD: 0.97 — SIGNIFICANT CHANGE UP (ref 0.88–1.17)
INR BLD: 1.14 — SIGNIFICANT CHANGE UP (ref 0.88–1.17)
LDH SERPL L TO P-CCNC: 370 U/L — HIGH (ref 135–225)
MAGNESIUM SERPL-MCNC: 2.9 MG/DL — HIGH (ref 1.6–2.6)
MAGNESIUM SERPL-MCNC: 3.6 MG/DL — HIGH (ref 1.6–2.6)
MCHC RBC-ENTMCNC: 25.4 PG — LOW (ref 27–34)
MCHC RBC-ENTMCNC: 25.7 PG — LOW (ref 27–34)
MCHC RBC-ENTMCNC: 31.4 % — LOW (ref 32–36)
MCHC RBC-ENTMCNC: 31.9 % — LOW (ref 32–36)
MCV RBC AUTO: 80.4 FL — SIGNIFICANT CHANGE UP (ref 80–100)
MCV RBC AUTO: 80.9 FL — SIGNIFICANT CHANGE UP (ref 80–100)
NRBC # FLD: 0 K/UL — SIGNIFICANT CHANGE UP (ref 0–0)
NRBC # FLD: 0 K/UL — SIGNIFICANT CHANGE UP (ref 0–0)
PHOSPHATE SERPL-MCNC: 5.5 MG/DL — HIGH (ref 2.5–4.5)
PHOSPHATE SERPL-MCNC: 6.7 MG/DL — HIGH (ref 2.5–4.5)
PLATELET # BLD AUTO: 221 K/UL — SIGNIFICANT CHANGE UP (ref 150–400)
PLATELET # BLD AUTO: 262 K/UL — SIGNIFICANT CHANGE UP (ref 150–400)
PMV BLD: 11.2 FL — SIGNIFICANT CHANGE UP (ref 7–13)
PMV BLD: 11.6 FL — SIGNIFICANT CHANGE UP (ref 7–13)
POTASSIUM SERPL-MCNC: 5 MMOL/L — SIGNIFICANT CHANGE UP (ref 3.5–5.3)
POTASSIUM SERPL-MCNC: 5.2 MMOL/L — SIGNIFICANT CHANGE UP (ref 3.5–5.3)
POTASSIUM SERPL-MCNC: 6.2 MMOL/L — CRITICAL HIGH (ref 3.5–5.3)
POTASSIUM SERPL-SCNC: 5 MMOL/L — SIGNIFICANT CHANGE UP (ref 3.5–5.3)
POTASSIUM SERPL-SCNC: 5.2 MMOL/L — SIGNIFICANT CHANGE UP (ref 3.5–5.3)
POTASSIUM SERPL-SCNC: 6.2 MMOL/L — CRITICAL HIGH (ref 3.5–5.3)
POTASSIUM UR-SCNC: > 100 MMOL/L — SIGNIFICANT CHANGE UP
PROT SERPL-MCNC: 5 G/DL — LOW (ref 6–8.3)
PROT SERPL-MCNC: 6.1 G/DL — SIGNIFICANT CHANGE UP (ref 6–8.3)
PROTHROM AB SERPL-ACNC: 10.8 SEC — SIGNIFICANT CHANGE UP (ref 9.8–13.1)
PROTHROM AB SERPL-ACNC: 13 SEC — SIGNIFICANT CHANGE UP (ref 9.8–13.1)
RBC # BLD: 3.27 M/UL — LOW (ref 3.8–5.2)
RBC # BLD: 4.09 M/UL — SIGNIFICANT CHANGE UP (ref 3.8–5.2)
RBC # FLD: 16.1 % — HIGH (ref 10.3–14.5)
RBC # FLD: 16.6 % — HIGH (ref 10.3–14.5)
SODIUM SERPL-SCNC: 133 MMOL/L — LOW (ref 135–145)
SODIUM SERPL-SCNC: 137 MMOL/L — SIGNIFICANT CHANGE UP (ref 135–145)
SODIUM UR-SCNC: 25 MMOL/L — SIGNIFICANT CHANGE UP
SPECIMEN SOURCE: SIGNIFICANT CHANGE UP
SPECIMEN SOURCE: SIGNIFICANT CHANGE UP
WBC # BLD: 10.92 K/UL — HIGH (ref 3.8–10.5)
WBC # BLD: 9.88 K/UL — SIGNIFICANT CHANGE UP (ref 3.8–10.5)
WBC # FLD AUTO: 10.92 K/UL — HIGH (ref 3.8–10.5)
WBC # FLD AUTO: 9.88 K/UL — SIGNIFICANT CHANGE UP (ref 3.8–10.5)

## 2019-09-08 PROCEDURE — 99232 SBSQ HOSP IP/OBS MODERATE 35: CPT

## 2019-09-08 PROCEDURE — 99223 1ST HOSP IP/OBS HIGH 75: CPT | Mod: GC

## 2019-09-08 PROCEDURE — 99291 CRITICAL CARE FIRST HOUR: CPT

## 2019-09-08 RX ORDER — FUROSEMIDE 40 MG
40 TABLET ORAL
Refills: 0 | Status: DISCONTINUED | OUTPATIENT
Start: 2019-09-08 | End: 2019-09-09

## 2019-09-08 RX ORDER — FUROSEMIDE 40 MG
40 TABLET ORAL ONCE
Refills: 0 | Status: COMPLETED | OUTPATIENT
Start: 2019-09-08 | End: 2019-09-08

## 2019-09-08 RX ORDER — CHLORHEXIDINE GLUCONATE 213 G/1000ML
1 SOLUTION TOPICAL
Refills: 0 | Status: DISCONTINUED | OUTPATIENT
Start: 2019-09-08 | End: 2019-09-09

## 2019-09-08 RX ORDER — LABETALOL HCL 100 MG
400 TABLET ORAL EVERY 8 HOURS
Refills: 0 | Status: DISCONTINUED | OUTPATIENT
Start: 2019-09-08 | End: 2019-09-08

## 2019-09-08 RX ORDER — DEXTROSE 50 % IN WATER 50 %
50 SYRINGE (ML) INTRAVENOUS ONCE
Refills: 0 | Status: COMPLETED | OUTPATIENT
Start: 2019-09-08 | End: 2019-09-08

## 2019-09-08 RX ORDER — INFLUENZA VIRUS VACCINE 15; 15; 15; 15 UG/.5ML; UG/.5ML; UG/.5ML; UG/.5ML
0.5 SUSPENSION INTRAMUSCULAR ONCE
Refills: 0 | Status: COMPLETED | OUTPATIENT
Start: 2019-09-08 | End: 2019-09-10

## 2019-09-08 RX ORDER — DEXTROSE 50 % IN WATER 50 %
50 SYRINGE (ML) INTRAVENOUS ONCE
Refills: 0 | Status: DISCONTINUED | OUTPATIENT
Start: 2019-09-08 | End: 2019-09-08

## 2019-09-08 RX ORDER — SODIUM CHLORIDE 9 MG/ML
500 INJECTION, SOLUTION INTRAVENOUS ONCE
Refills: 0 | Status: DISCONTINUED | OUTPATIENT
Start: 2019-09-08 | End: 2019-09-08

## 2019-09-08 RX ORDER — HYDRALAZINE HCL 50 MG
50 TABLET ORAL EVERY 8 HOURS
Refills: 0 | Status: DISCONTINUED | OUTPATIENT
Start: 2019-09-08 | End: 2019-09-12

## 2019-09-08 RX ORDER — SODIUM CHLORIDE 9 MG/ML
500 INJECTION, SOLUTION INTRAVENOUS ONCE
Refills: 0 | Status: COMPLETED | OUTPATIENT
Start: 2019-09-08 | End: 2019-09-08

## 2019-09-08 RX ORDER — NICARDIPINE HYDROCHLORIDE 30 MG/1
30 CAPSULE, EXTENDED RELEASE ORAL EVERY 8 HOURS
Refills: 0 | Status: DISCONTINUED | OUTPATIENT
Start: 2019-09-08 | End: 2019-09-11

## 2019-09-08 RX ORDER — SODIUM CHLORIDE 9 MG/ML
1000 INJECTION, SOLUTION INTRAVENOUS ONCE
Refills: 0 | Status: COMPLETED | OUTPATIENT
Start: 2019-09-08 | End: 2019-09-08

## 2019-09-08 RX ORDER — INSULIN HUMAN 100 [IU]/ML
10 INJECTION, SOLUTION SUBCUTANEOUS ONCE
Refills: 0 | Status: COMPLETED | OUTPATIENT
Start: 2019-09-08 | End: 2019-09-08

## 2019-09-08 RX ADMIN — Medication 40 MILLIGRAM(S): at 06:44

## 2019-09-08 RX ADMIN — Medication 50 MILLIGRAM(S): at 12:42

## 2019-09-08 RX ADMIN — Medication 400 MILLIGRAM(S): at 08:42

## 2019-09-08 RX ADMIN — SODIUM CHLORIDE 500 MILLILITER(S): 9 INJECTION, SOLUTION INTRAVENOUS at 04:06

## 2019-09-08 RX ADMIN — SODIUM CHLORIDE 2000 MILLILITER(S): 9 INJECTION, SOLUTION INTRAVENOUS at 06:45

## 2019-09-08 RX ADMIN — HEPARIN SODIUM 5000 UNIT(S): 5000 INJECTION INTRAVENOUS; SUBCUTANEOUS at 05:23

## 2019-09-08 RX ADMIN — Medication 400 MILLIGRAM(S): at 00:38

## 2019-09-08 RX ADMIN — Medication 400 MILLIGRAM(S): at 05:23

## 2019-09-08 RX ADMIN — CHLORHEXIDINE GLUCONATE 1 APPLICATION(S): 213 SOLUTION TOPICAL at 11:46

## 2019-09-08 RX ADMIN — Medication 50 MILLILITER(S): at 06:45

## 2019-09-08 RX ADMIN — INSULIN HUMAN 10 UNIT(S): 100 INJECTION, SOLUTION SUBCUTANEOUS at 06:44

## 2019-09-08 RX ADMIN — Medication 40 MILLIGRAM(S): at 16:01

## 2019-09-08 RX ADMIN — Medication 400 MILLIGRAM(S): at 18:00

## 2019-09-08 RX ADMIN — HEPARIN SODIUM 5000 UNIT(S): 5000 INJECTION INTRAVENOUS; SUBCUTANEOUS at 18:01

## 2019-09-08 RX ADMIN — LEVETIRACETAM 400 MILLIGRAM(S): 250 TABLET, FILM COATED ORAL at 05:23

## 2019-09-08 RX ADMIN — Medication 400 MILLIGRAM(S): at 11:37

## 2019-09-08 RX ADMIN — NICARDIPINE HYDROCHLORIDE 30 MILLIGRAM(S): 30 CAPSULE, EXTENDED RELEASE ORAL at 22:25

## 2019-09-08 RX ADMIN — NICARDIPINE HYDROCHLORIDE 30 MILLIGRAM(S): 30 CAPSULE, EXTENDED RELEASE ORAL at 14:00

## 2019-09-08 RX ADMIN — Medication 50 MILLIGRAM(S): at 20:32

## 2019-09-08 RX ADMIN — Medication 1000 MILLIGRAM(S): at 05:53

## 2019-09-08 RX ADMIN — Medication 60 MILLIGRAM(S): at 06:06

## 2019-09-08 NOTE — PROGRESS NOTE ADULT - ASSESSMENT
Patient is a 29 year old femlae  at 30.5 weeks GA who was sent from OB's private office for evaluation of elevated blood pressure in the office (158/88) and reports on sono today baby measuring SGA as per patient report.  Patient now S/P  on 19 with concern for post-operative sepsis in setting of hypothermia.      PLAN:    NEUROLOGY:  - Continue with Acetaminophen and Oxycodone PRN for pain control  - Continue with IV Keppra for seizure prophylaxis    RESPIRATORY:  - No active issues  - Saturating well on room air  - Maintain O2 saturation >92%    CARDIOVASCULAR:  - Previously on Procardia and Labetalol for hypertension  - Keep MAP >65    GI / NUTRITION:  - Regular diet    RENAL / GENITOURINARY:  - Hyperkalemic (last K 5.7 S/P hyperkalemia cocktail)  - Nephrology following patient  - Indwelling vuong catheter  - Continue to trend potassium  - Continue to monitor strict ins and outs q1 hour    HEMATOLOGIC:  - Continue to monitor CBC daily (concern for HELLP syndome)  - Platelets currently 181K    INFECTIOUS DISEASE:  - Hypothermic with no leukocytosis  - Lactate normal at 1.5  - Blood cultures x2 and urine culture pending  - Continue with IV Invanz    ENDOCRINOLOGY:  - No active issues  - Continue to monitor glucose on BMP (goal 140-180)      Disposition: SICU    -------------------------------------------------------------------------------------- Patient is a 29 year old femlae  at 30.5 weeks GA who was sent from OB's private office for evaluation of elevated blood pressure in the office (158/88) and reports on sono today baby measuring SGA as per patient report.  Patient now S/P  on 19 with concern for post-operative sepsis in setting of hypothermia, hyperkalemia in setting of FARIHA vs. hemolysis vs pseudohyperkalemia      PLAN:    NEUROLOGY:  - Continue with Acetaminophen and Oxycodone PRN for pain control  - Continue with IV Keppra for seizure prophylaxis in setting of pre-eclampsia, no Mg given that hypermagnesemia may be contributing to hyperkalemia      RESPIRATORY:  - No active issues  - Saturating well on room air  - Maintain O2 saturation >92%    CARDIOVASCULAR:  - Pre-eclampsia, goal SBP < 160s  - D/c Labetalol and Nifedipine as may inhibit RAAS- starting Hydralazine 50 Q8H and Nicardipine 30 mg Q8H  - Keep MAP >65    GI / NUTRITION:  - Regular diet    RENAL / GENITOURINARY:  - Hyperkalemia now resolving- FARIHA vs hemolysis vs. pseudohyperkalemia   - C/w Lasix 40 mg IV BID, c/w vuong  - Strict Is/Os    HEMATOLOGIC:  - Continue to monitor CBC daily (concern for HELLP syndome)  - Platelets currently 181K    INFECTIOUS DISEASE:  - Hypothermic with no leukocytosis  - Lactate normal at 1.5  - low suspicion of sepsis d/c IV antibiotics    ENDOCRINOLOGY:  - No active issues  - Continue to monitor glucose on BMP (goal 140-180)      Disposition: SICU    ---------------------------------------------------------------------------------------------------

## 2019-09-08 NOTE — PROGRESS NOTE ADULT - SUBJECTIVE AND OBJECTIVE BOX
Acute OB Pain Service Note    POD#1 S/P  Section    Pt is doing well.    Pain Scale Score at rest:    0 /10          with activity:   2/10    The patient received Duramorph 0.1  mg via intrathecal injection on 2019.    Intrathecal injection site is: without erythema, discharge, edema, and is nontender.    The patient is afebrile, alert and oriented    Side Effects: No PONV, No Pruritis, No Numbness, No Neuromuscular deficits noted.  Continue prn p.o. analgesics

## 2019-09-08 NOTE — PROGRESS NOTE ADULT - ASSESSMENT
30y/o POD#1 from [facility] for sPEC with antepartum course complicated by IUGR and hyperkalemia and now postoperative course complicated further by hypothermia. Pt transferred to SICU overnight in setting of hypothermia postoperatively with concern for sepsis. Pt also with severe BPs overnight requiring IVP labetalol 20 and pt continues of preop meds for BP control. Pt also with low UOP overnight, status post 2 - 500cc boluses.       Neuro: Pain well controlled with current regimen  CV: Hemodynamically stable, Hct 30->33->26. No tachycardia, no hypotension. Pt with continued hypertension overnight requiring IV medications, recomend continuation of labetalol and procardia for BP control and Keppra for seizure ppx.   Pulm: O2 sat WNL on RA. Increase ambulation, encourage incentive spirometry use  GI: Tolerating clears  : UOP inadequate, status post 2 - 500cc boluses overnight without improvement in UOP. Cr increased 1.12->1.24. Continued hyperkalemia, worsening overnight from 5.7->6.2. Recommend EKG, treatment to of hyperkalemia with calcium gluconate, insulin, and/or albuterol along with rpt labs to downtrend.   Heme: DVT ppx: HSQ, SCDs while in bed  ID: Hypothermic to 34.2 overnight. Lactate wnl.  Dispo: appreciate SICU care     d/w OB team   Ayanna PGY3 30y/o POD#1 from [facility] for sPEC with antepartum course complicated by IUGR and hyperkalemia and now postoperative course complicated further by hypothermia. Pt transferred to SICU overnight in setting of hypothermia postoperatively with concern for sepsis. Pt also with severe BPs overnight requiring IVP labetalol 20 and pt continues of preop meds for BP control. Pt also with low UOP overnight, status post 2 - 500cc boluses.       Neuro: Pain well controlled with current regimen. Recommend Keppra for seizure ppx given severe preeclampsia with risk of seizure especially elevated in first 24h postpartum.   CV: Hemodynamically stable, Hct 30->33->26. No tachycardia, no hypotension. Pt with severe range BPs overnight requiring IVP labetalol 10mg. Recommend continuation of labetalol 400tid and procardia 60 for BP control.   Pulm: O2 sat WNL on RA. Increase ambulation, encourage incentive spirometry use  GI: Tolerating clears  : UOP inadequate, status post 2 - 500cc boluses overnight without improvement in UOP. Cr increased 1.12->1.24. Continued hyperkalemia, worsening overnight from 5.7->6.2. Recommend EKG and treatment of hyperkalemia with calcium gluconate, insulin, and/or albuterol along with rpt labs to downtrend.   Heme: DVT ppx: HSQ, SCDs while in bed  ID: Hypothermic to 34.2 overnight, improved with warming blanket. Lactate wnl.  Dispo: appreciate SICU care     d/w OB team   Ayanna PGY3

## 2019-09-08 NOTE — PROGRESS NOTE ADULT - SUBJECTIVE AND OBJECTIVE BOX
Postpartum Note,  Section  She is a  29y woman who is now post-operative day: 1    Subjective:  The patient feels well.  She denies nausea and vomiting.  She is voiding.  Her pain is controlled.  She reports normal postpartum bleeding.    Physical exam:    Vital Signs Last 24 Hrs  T(C): 35.9 (08 Sep 2019 08:00), Max: 36.8 (08 Sep 2019 00:30)  T(F): 96.7 (08 Sep 2019 08:00), Max: 98.2 (08 Sep 2019 00:30)  HR: 74 (08 Sep 2019 09:00) (59 - 85)  BP: 149/92 (08 Sep 2019 09:00) (115/72 - 173/100)  BP(mean): 104 (08 Sep 2019 09:00) (78 - 115)  RR: 14 (08 Sep 2019 09:00) (8 - 24)  SpO2: 98% (08 Sep 2019 09:00) (96% - 100%)    Gen: NAD  Breast: Soft, nontender, not engorged.  Abdomen: Soft, nontender, slight distension , firm uterine fundus at 4 fb belowumbilicus.  Incision: Clean, dry, and intact with steri strips in place  Pelvic: Normal lochia noted  Ext: No calf tenderness    LABS:                        8.4    9.88  )-----------( 221      ( 08 Sep 2019 04:35 )             26.3       No Known Drug Allergies  shellfish (Hives)    Intolerances      MEDICATIONS  (STANDING):  acetaminophen   Tablet .. 975 milliGRAM(s) Oral every 6 hours  acetaminophen  IVPB .. 1000 milliGRAM(s) IV Intermittent every 6 hours  chlorhexidine 4% Liquid 1 Application(s) Topical <User Schedule>  dextrose 5%. 1000 milliLiter(s) (1000 mL/Hr) IV Continuous <Continuous>  dextrose 50% Injectable 50 milliLiter(s) IV Push once  diphtheria/tetanus/pertussis (acellular) Vaccine (ADAcel) 0.5 milliLiter(s) IntraMuscular once  ertapenem  IVPB 1000 milliGRAM(s) IV Intermittent every 24 hours  furosemide   Injectable 40 milliGRAM(s) IV Push two times a day  heparin  Injectable 5000 Unit(s) SubCutaneous every 12 hours  influenza   Vaccine 0.5 milliLiter(s) IntraMuscular once  labetalol 400 milliGRAM(s) Oral every 8 hours  lactated ringers Bolus 500 milliLiter(s) IV Bolus once  NIFEdipine XL 60 milliGRAM(s) Oral daily  oxytocin Infusion 333.333 milliUNIT(s)/Min (1000 mL/Hr) IV Continuous <Continuous>  oxytocin Infusion 333.333 milliUNIT(s)/Min (1000 mL/Hr) IV Continuous <Continuous>    MEDICATIONS  (PRN):  diphenhydrAMINE 25 milliGRAM(s) Oral every 6 hours PRN Itching  docusate sodium 100 milliGRAM(s) Oral two times a day PRN Stool softening  glycerin Suppository - Adult 1 Suppository(s) Rectal at bedtime PRN Constipation  lanolin Ointment 1 Application(s) Topical every 6 hours PRN Sore Nipples  magnesium hydroxide Suspension 30 milliLiter(s) Oral two times a day PRN Constipation  oxyCODONE    IR 5 milliGRAM(s) Oral every 3 hours PRN Moderate to Severe Pain (4-10)  oxyCODONE    IR 5 milliGRAM(s) Oral once PRN Moderate to Severe Pain (4-10)  simethicone 80 milliGRAM(s) Chew every 4 hours PRN Gas        Assessment and Plan:  POD #2  s/p  section   Preeclampsia with severe features and renal involvement  hyperkalemia-improving  BP stable on Procardia and Labetalol  Continue Keppra for full 24hr postop course  Electrolyte/renal management as per SICU  Hypothermia resolved  DVT ppx/Encourage ambulation.  Advance diet  Continue routine post op care.

## 2019-09-08 NOTE — CHART NOTE - NSCHARTNOTEFT_GEN_A_CORE
MFM Attending Note    I saw and evaluated Ms. Marcelo this afternoon. She is feeling well, denies headache, right upper quadrant or epigastric pain, vision changes, nausea or vomiting. Reports some incisional soreness that is controlled with analgesia. Denies chest pain, shortness of breath, palpitations.   Tolerating regular diet, ambulated with assistance, Gupta in place. MFM Attending Note    I saw and evaluated Ms. Marcelo this afternoon. She is feeling well, denies headache, right upper quadrant or epigastric pain, vision changes, nausea or vomiting. Reports some incisional soreness that is controlled with analgesia. Denies chest pain, shortness of breath, palpitations, fevers/chills.  Tolerating regular diet, ambulated with assistance, Gupta in place with excellent UOP.     ICU Vital Signs Last 24 Hrs  T(C): 36.4 (08 Sep 2019 16:00), Max: 37.2 (08 Sep 2019 12:00)  T(F): 97.5 (08 Sep 2019 16:00), Max: 99 (08 Sep 2019 12:00)  HR: 95 (08 Sep 2019 19:00) (59 - 95)  BP: 133/77 (08 Sep 2019 19:) (123/72 - 173/100) --> well controlled now, high of 157/87 mmHg at 1pm  BP(mean): 91 (08 Sep 2019 19:00) (82 - 115)  RR: 15 (08 Sep 2019 19:) (8 - 24)  SpO2: 98% (08 Sep 2019 19:) (95% - 100%)    UOP 3380/shift    Gen - NAD, comfortable in chair  Abd - soft, appropriately tender, incision C/D/I  Ext - 2+ pitting edema bilaterally               10.4   10.92 )-----------( 262      (  @ 16:55 )             33.1                8.4    9.88  )-----------( 221      (  @ 04:35 )             26.3                10.7   6.90  )-----------( 242      (  @ 21:10 )             34.3        137  |  103  |  32<H>  ----------------------------<  85  5.2   |  22  |  1.21    Ca    8.9      08 Sep 2019 16:55  Phos  5.5       Mg     2.9     08    TPro  6.1  /  Alb  3.0<L>  /  TBili  0.3  /  DBili  x   /  AST  55<H>  /  ALT  38<H>  /  AlkPhos  122<H>  -      29 year old P1 POD#1 s/p  delivery at 31 weeks gestational age with preeclampsia with severe features (severe range hypertension and renal dysfunction, 3g proteinuria and rising Cr/UA) complicated by hyperkalemia responsive to insulin and lasix and postpartum hypothermia, resolved. Stable renal function with improving electrolytes, hyperkalemia likely secondary to magnesium (used for seizure prophylaxis).    Appreciate excellent SICU care of patient. Nephrology following, recommendations are appreciated.   Postpartum BP goal <=150/90mmHg, controlled now with lasix, nifedipine and hydralazine.   Completed 24 hours postpartum Keppra for seizure prophylaxis.   Lactation consultant at bedside with breast pump.       Please call with questions.     Teresa Rose MD

## 2019-09-08 NOTE — PROGRESS NOTE ADULT - SUBJECTIVE AND OBJECTIVE BOX
29y s/p  POD # 1      Pt seen and examined at bedside. Overnight pt hypothermic to 34.2 postop from pLTCS for sPEC complicated by electrolyte abnormalities. Given concern for sepsis in setting of postoperative pt with hypothermia, pt was transferred to SICU for higher level of care. Pt also with hyperkalemia improved to 5.8->5.7 overnight . Given electrolyte abnormalities, keppra given for seizure ppx in place of mag sulfate. Pt also with severe range BPs overnight requiring IVP labetalol 20 prior to transfer to SICU.     Pt without complaints. Pain well controlled on current regimen.   She denies SOB/CP/palpitations, fever/chills, nausea/emesis. Tolerating clears diet.  Not yet OOB,  No flatus, vuong in place    MEDICATIONS  (STANDING):  acetaminophen   Tablet .. 975 milliGRAM(s) Oral every 6 hours  acetaminophen  IVPB .. 1000 milliGRAM(s) IV Intermittent every 6 hours  chlorhexidine 4% Liquid 1 Application(s) Topical <User Schedule>  dextrose 5%. 1000 milliLiter(s) (1000 mL/Hr) IV Continuous <Continuous>  diphtheria/tetanus/pertussis (acellular) Vaccine (ADAcel) 0.5 milliLiter(s) IntraMuscular once  ertapenem  IVPB 1000 milliGRAM(s) IV Intermittent every 24 hours  heparin  Injectable 5000 Unit(s) SubCutaneous every 12 hours  influenza   Vaccine 0.5 milliLiter(s) IntraMuscular once  labetalol 400 milliGRAM(s) Oral every 8 hours  lactated ringers Bolus 500 milliLiter(s) IV Bolus once  lactated ringers. 1000 milliLiter(s) (125 mL/Hr) IV Continuous <Continuous>  NIFEdipine XL 60 milliGRAM(s) Oral daily  oxytocin Infusion 333.333 milliUNIT(s)/Min (1000 mL/Hr) IV Continuous <Continuous>  oxytocin Infusion 333.333 milliUNIT(s)/Min (1000 mL/Hr) IV Continuous <Continuous>    MEDICATIONS  (PRN):  diphenhydrAMINE 25 milliGRAM(s) Oral every 6 hours PRN Itching  docusate sodium 100 milliGRAM(s) Oral two times a day PRN Stool softening  glycerin Suppository - Adult 1 Suppository(s) Rectal at bedtime PRN Constipation  lanolin Ointment 1 Application(s) Topical every 6 hours PRN Sore Nipples  magnesium hydroxide Suspension 30 milliLiter(s) Oral two times a day PRN Constipation  oxyCODONE    IR 5 milliGRAM(s) Oral every 3 hours PRN Moderate to Severe Pain (4-10)  oxyCODONE    IR 5 milliGRAM(s) Oral once PRN Moderate to Severe Pain (4-10)  simethicone 80 milliGRAM(s) Chew every 4 hours PRN Gas        Vital Signs Last 24 Hrs  T(C): 36.6 (08 Sep 2019 02:20), Max: 36.8 (08 Sep 2019 00:30)  T(F): 97.9 (08 Sep 2019 02:20), Max: 98.2 (08 Sep 2019 00:30)  HR: 70 (08 Sep 2019 05:00) (59 - 85)  BP: 137/89 (08 Sep 2019 05:00) (115/72 - 173/100)  BP(mean): 100 (08 Sep 2019 05:00) (78 - 115)  RR: 11 (08 Sep 2019 05:00) (8 - 24)  SpO2: 97% (08 Sep 2019 05:00) (96% - 100%)    09-06 @ 07:01  -  09-07 @ 07:00  --------------------------------------------------------  IN: 1000 mL / OUT: 900 mL / NET: 100 mL    09-07 @ 07:01  -  09-08 @ 05:30  --------------------------------------------------------  IN: 2950 mL / OUT: 2320 mL / NET: 630 mL        PHYSICAL EXAM:    Gen: NAD, A+0 x 3  CV: RRR  Pulm: CTA BL  Abd: Soft, appropriately tender,  mildly distended, no rebound or guarding, hypoactive BS. Fundus firm at level of umbilicus. No rebound, no guarding.   Incision: Clean, dry and intact; dressing in place  Extremities: No swelling or calf tenderness, SCDs in place    Labs, additional tests:             8.4    9.88  )-----------( 221      ( 09-08 @ 04:35 )             26.3                10.7   6.90  )-----------( 242      ( 09-07 @ 21:10 )             34.3                10.0   6.48  )-----------( 181      ( 09-07 @ 15:56 )             33.3                9.7    6.94  )-----------( 233      ( 09-07 @ 09:00 )             30.4                10.1   7.11  )-----------( 240      ( 09-07 @ 03:02 )             32.9                8.7    5.53  )-----------( 219      ( 09-06 @ 19:00 )             27.7       09-08    133<L>  |  100  |  30<H>  ----------------------------<  92  6.2<HH>   |  20<L>  |  1.24    Ca    7.6<L>      08 Sep 2019 04:35  Phos  6.7     09-08  Mg     3.6     09-08    TPro  5.0<L>  /  Alb  2.5<L>  /  TBili  0.3  /  DBili  < 0.2  /  AST  42<H>  /  ALT  32  /  AlkPhos  103  09-08 29y s/p  POD # 1      Pt seen and examined at bedside. Overnight pt hypothermic to 34.2 postop from pLTCS for sPEC complicated by hyperkalemia. abnormalities. Given concern for sepsis in setting of postoperative pt with hypothermia, pt was transferred to SICU for higher level of care. Pt also with hyperkalemia improved to 5.8->5.7 overnight . Given electrolyte abnormalities, keppra given for seizure ppx in place of mag sulfate. Pt also with severe range BPs overnight requiring IVP labetalol 20 prior to transfer to SICU.     Pt without complaints. Pain well controlled on current regimen.   She denies SOB/CP/palpitations, fever/chills, nausea/emesis. Tolerating clears diet.  Not yet OOB,  No flatus, vuong in place    MEDICATIONS  (STANDING):  acetaminophen   Tablet .. 975 milliGRAM(s) Oral every 6 hours  acetaminophen  IVPB .. 1000 milliGRAM(s) IV Intermittent every 6 hours  chlorhexidine 4% Liquid 1 Application(s) Topical <User Schedule>  dextrose 5%. 1000 milliLiter(s) (1000 mL/Hr) IV Continuous <Continuous>  diphtheria/tetanus/pertussis (acellular) Vaccine (ADAcel) 0.5 milliLiter(s) IntraMuscular once  ertapenem  IVPB 1000 milliGRAM(s) IV Intermittent every 24 hours  heparin  Injectable 5000 Unit(s) SubCutaneous every 12 hours  influenza   Vaccine 0.5 milliLiter(s) IntraMuscular once  labetalol 400 milliGRAM(s) Oral every 8 hours  lactated ringers Bolus 500 milliLiter(s) IV Bolus once  lactated ringers. 1000 milliLiter(s) (125 mL/Hr) IV Continuous <Continuous>  NIFEdipine XL 60 milliGRAM(s) Oral daily  oxytocin Infusion 333.333 milliUNIT(s)/Min (1000 mL/Hr) IV Continuous <Continuous>  oxytocin Infusion 333.333 milliUNIT(s)/Min (1000 mL/Hr) IV Continuous <Continuous>    MEDICATIONS  (PRN):  diphenhydrAMINE 25 milliGRAM(s) Oral every 6 hours PRN Itching  docusate sodium 100 milliGRAM(s) Oral two times a day PRN Stool softening  glycerin Suppository - Adult 1 Suppository(s) Rectal at bedtime PRN Constipation  lanolin Ointment 1 Application(s) Topical every 6 hours PRN Sore Nipples  magnesium hydroxide Suspension 30 milliLiter(s) Oral two times a day PRN Constipation  oxyCODONE    IR 5 milliGRAM(s) Oral every 3 hours PRN Moderate to Severe Pain (4-10)  oxyCODONE    IR 5 milliGRAM(s) Oral once PRN Moderate to Severe Pain (4-10)  simethicone 80 milliGRAM(s) Chew every 4 hours PRN Gas        Vital Signs Last 24 Hrs  T(C): 36.6 (08 Sep 2019 02:20), Max: 36.8 (08 Sep 2019 00:30)  T(F): 97.9 (08 Sep 2019 02:20), Max: 98.2 (08 Sep 2019 00:30)  HR: 70 (08 Sep 2019 05:00) (59 - 85)  BP: 137/89 (08 Sep 2019 05:00) (115/72 - 173/100)  BP(mean): 100 (08 Sep 2019 05:00) (78 - 115)  RR: 11 (08 Sep 2019 05:00) (8 - 24)  SpO2: 97% (08 Sep 2019 05:00) (96% - 100%)    09-06 @ 07:01  -  09-07 @ 07:00  --------------------------------------------------------  IN: 1000 mL / OUT: 900 mL / NET: 100 mL    09-07 @ 07:01  -  09-08 @ 05:30  --------------------------------------------------------  IN: 2950 mL / OUT: 2320 mL / NET: 630 mL        PHYSICAL EXAM:    Gen: NAD, A+0 x 3  CV: RRR  Pulm: CTA BL  Abd: Soft, appropriately tender,  mildly distended, no rebound or guarding, hypoactive BS. Fundus firm at level of umbilicus. No rebound, no guarding.   Incision: Pfannenstiel incision clean, dry and intact; dressing in place  Pelvic: Lochia wnl   Extremities: No swelling or calf tenderness, SCDs in place    Labs, additional tests:             8.4    9.88  )-----------( 221      ( 09-08 @ 04:35 )             26.3                10.7   6.90  )-----------( 242      ( 09-07 @ 21:10 )             34.3                10.0   6.48  )-----------( 181      ( 09-07 @ 15:56 )             33.3                9.7    6.94  )-----------( 233      ( 09-07 @ 09:00 )             30.4                10.1   7.11  )-----------( 240      ( 09-07 @ 03:02 )             32.9                8.7    5.53  )-----------( 219      ( 09-06 @ 19:00 )             27.7       09-08    133<L>  |  100  |  30<H>  ----------------------------<  92  6.2<HH>   |  20<L>  |  1.24    Ca    7.6<L>      08 Sep 2019 04:35  Phos  6.7     09-08  Mg     3.6     09-08    TPro  5.0<L>  /  Alb  2.5<L>  /  TBili  0.3  /  DBili  < 0.2  /  AST  42<H>  /  ALT  32  /  AlkPhos  103  09-08 POD # 1      Pt seen and examined at bedside. Overnight pt hypothermic to 34.2 postop from pLTCS for sPEC complicated by hyperkalemia. abnormalities. Given concern for sepsis in setting of postoperative pt with hypothermia, pt was transferred to SICU for higher level of care. Pt also with hyperkalemia improved to 5.8->5.7 overnight . Given electrolyte abnormalities, keppra given for seizure ppx in place of mag sulfate. Pt also with severe range BPs overnight requiring IVP labetalol 20 prior to transfer to SICU.     Pt without complaints. Pain well controlled on current regimen.   She denies SOB/CP/palpitations, fever/chills, nausea/emesis. Tolerating clears diet.  Not yet OOB,  No flatus, vuong in place    MEDICATIONS  (STANDING):  acetaminophen   Tablet .. 975 milliGRAM(s) Oral every 6 hours  acetaminophen  IVPB .. 1000 milliGRAM(s) IV Intermittent every 6 hours  chlorhexidine 4% Liquid 1 Application(s) Topical <User Schedule>  dextrose 5%. 1000 milliLiter(s) (1000 mL/Hr) IV Continuous <Continuous>  diphtheria/tetanus/pertussis (acellular) Vaccine (ADAcel) 0.5 milliLiter(s) IntraMuscular once  ertapenem  IVPB 1000 milliGRAM(s) IV Intermittent every 24 hours  heparin  Injectable 5000 Unit(s) SubCutaneous every 12 hours  influenza   Vaccine 0.5 milliLiter(s) IntraMuscular once  labetalol 400 milliGRAM(s) Oral every 8 hours  lactated ringers Bolus 500 milliLiter(s) IV Bolus once  lactated ringers. 1000 milliLiter(s) (125 mL/Hr) IV Continuous <Continuous>  NIFEdipine XL 60 milliGRAM(s) Oral daily  oxytocin Infusion 333.333 milliUNIT(s)/Min (1000 mL/Hr) IV Continuous <Continuous>  oxytocin Infusion 333.333 milliUNIT(s)/Min (1000 mL/Hr) IV Continuous <Continuous>    MEDICATIONS  (PRN):  diphenhydrAMINE 25 milliGRAM(s) Oral every 6 hours PRN Itching  docusate sodium 100 milliGRAM(s) Oral two times a day PRN Stool softening  glycerin Suppository - Adult 1 Suppository(s) Rectal at bedtime PRN Constipation  lanolin Ointment 1 Application(s) Topical every 6 hours PRN Sore Nipples  magnesium hydroxide Suspension 30 milliLiter(s) Oral two times a day PRN Constipation  oxyCODONE    IR 5 milliGRAM(s) Oral every 3 hours PRN Moderate to Severe Pain (4-10)  oxyCODONE    IR 5 milliGRAM(s) Oral once PRN Moderate to Severe Pain (4-10)  simethicone 80 milliGRAM(s) Chew every 4 hours PRN Gas        Vital Signs Last 24 Hrs  T(C): 36.6 (08 Sep 2019 02:20), Max: 36.8 (08 Sep 2019 00:30)  T(F): 97.9 (08 Sep 2019 02:20), Max: 98.2 (08 Sep 2019 00:30)  HR: 70 (08 Sep 2019 05:00) (59 - 85)  BP: 137/89 (08 Sep 2019 05:00) (115/72 - 173/100)  BP(mean): 100 (08 Sep 2019 05:00) (78 - 115)  RR: 11 (08 Sep 2019 05:00) (8 - 24)  SpO2: 97% (08 Sep 2019 05:00) (96% - 100%)    09-06 @ 07:01  -  09-07 @ 07:00  --------------------------------------------------------  IN: 1000 mL / OUT: 900 mL / NET: 100 mL    09-07 @ 07:01  -  09-08 @ 05:30  --------------------------------------------------------  IN: 2950 mL / OUT: 2320 mL / NET: 630 mL        PHYSICAL EXAM:    Gen: NAD, A+0 x 3  CV: RRR  Pulm: CTA BL  Abd: Soft, appropriately tender,  mildly distended, no rebound or guarding, hypoactive BS. Fundus firm at level of umbilicus. No rebound, no guarding.   Incision: Pfannenstiel incision clean, dry and intact; dressing in place  Pelvic: Lochia wnl   Extremities: +pitting edema of bilateral LE and hands. No calf tenderness, SCDs in place    Labs, additional tests:             8.4    9.88  )-----------( 221      ( 09-08 @ 04:35 )             26.3                10.7   6.90  )-----------( 242      ( 09-07 @ 21:10 )             34.3                10.0   6.48  )-----------( 181      ( 09-07 @ 15:56 )             33.3                9.7    6.94  )-----------( 233      ( 09-07 @ 09:00 )             30.4                10.1   7.11  )-----------( 240      ( 09-07 @ 03:02 )             32.9                8.7    5.53  )-----------( 219      ( 09-06 @ 19:00 )             27.7       09-08    133<L>  |  100  |  30<H>  ----------------------------<  92  6.2<HH>   |  20<L>  |  1.24    Ca    7.6<L>      08 Sep 2019 04:35  Phos  6.7     09-08  Mg     3.6     09-08    TPro  5.0<L>  /  Alb  2.5<L>  /  TBili  0.3  /  DBili  < 0.2  /  AST  42<H>  /  ALT  32  /  AlkPhos  103  09-08

## 2019-09-08 NOTE — PROGRESS NOTE ADULT - SUBJECTIVE AND OBJECTIVE BOX
OB Note    29y s/p C/D 2/2 sPEC and hyperkalemia, post-operative course c/b hypothermia, admitted to SICU, POD#1    INTERVAL HPI/OVERNIGHT EVENTS: Pt seen and examined at bedside.  Hypothermia resolved overnight, this AM, K 6.2, received insulin 10U and restarted on lasix 40.  Repeat labs show K responding to tx, most recent K 5.2.  BP meds switched to hydralazine and nicardipine.    Pt denies any nausea, vomiting, fevers, chills, HA, blurry vision, pain well controlled, is not yet ambulating but is out of bed and standing up, tolerating PO, vuong in situ.  Denies SOB, CP, muscle weakness, soreness, pain.    MEDICATIONS  (STANDING):  acetaminophen   Tablet .. 975 milliGRAM(s) Oral every 6 hours  acetaminophen  IVPB .. 1000 milliGRAM(s) IV Intermittent every 6 hours  chlorhexidine 4% Liquid 1 Application(s) Topical <User Schedule>  diphtheria/tetanus/pertussis (acellular) Vaccine (ADAcel) 0.5 milliLiter(s) IntraMuscular once  furosemide   Injectable 40 milliGRAM(s) IV Push two times a day  heparin  Injectable 5000 Unit(s) SubCutaneous every 12 hours  hydrALAZINE 50 milliGRAM(s) Oral every 8 hours  influenza   Vaccine 0.5 milliLiter(s) IntraMuscular once  niCARdipine 30 milliGRAM(s) Oral every 8 hours    MEDICATIONS  (PRN):  diphenhydrAMINE 25 milliGRAM(s) Oral every 6 hours PRN Itching  docusate sodium 100 milliGRAM(s) Oral two times a day PRN Stool softening  glycerin Suppository - Adult 1 Suppository(s) Rectal at bedtime PRN Constipation  lanolin Ointment 1 Application(s) Topical every 6 hours PRN Sore Nipples  magnesium hydroxide Suspension 30 milliLiter(s) Oral two times a day PRN Constipation  oxyCODONE    IR 5 milliGRAM(s) Oral every 3 hours PRN Moderate to Severe Pain (4-10)  oxyCODONE    IR 5 milliGRAM(s) Oral once PRN Moderate to Severe Pain (4-10)  simethicone 80 milliGRAM(s) Chew every 4 hours PRN Gas        Vital Signs Last 24 Hrs  T(C): 36.4 (08 Sep 2019 16:00), Max: 37.2 (08 Sep 2019 12:00)  T(F): 97.5 (08 Sep 2019 16:00), Max: 99 (08 Sep 2019 12:00)  HR: 95 (08 Sep 2019 19:00) (64 - 95)  BP: 133/77 (08 Sep 2019 19:00) (123/72 - 173/100)  BP(mean): 91 (08 Sep 2019 19:00) (82 - 115)  RR: 15 (08 Sep 2019 19:) (9 - 24)  SpO2: 98% (08 Sep 2019 19:00) (95% - 100%)    I&O's Summary    07 Sep 2019 07:  -  08 Sep 2019 07:00  --------------------------------------------------------  IN: 4075 mL / OUT: 2338 mL / NET: 1737 mL    08 Sep 2019 07:01  -  08 Sep 2019 20:14  --------------------------------------------------------  IN: 800 mL / OUT: 3380 mL / NET: -2580 mL    PHYSICAL EXAM:    GA: NAD, A+0 x 3  Cardio: nl S1/S2  Pulm: CTABL  Abd: soft, nontender, nondistended, no rebound or guarding   Incision: Pfannenstiel incision clean, dry and intact; steri-strips in place  Uterus: Fundus midline; firm below umbilicus  : lochia WNL  Vuong: in situ, draining clear yellow urine  Extremities: no calf tenderness or erythema, +1 edema in LE and UE        LABS:                        10.4   10.92 )-----------( 262      ( 08 Sep 2019 16:55 )             33.1     -08    137  |  103  |  32<H>  ----------------------------<  85  5.2   |  22  |  1.21    Ca    8.9      08 Sep 2019 16:55  Phos  5.5       Mg     2.9         TPro  6.1  /  Alb  3.0<L>  /  TBili  0.3  /  DBili  x   /  AST  55<H>  /  ALT  38<H>  /  AlkPhos  122<H>      PT/INR - ( 08 Sep 2019 16:55 )   PT: 13.0 SEC;   INR: 1.14          PTT - ( 08 Sep 2019 16:55 )  PTT:26.5 SEC  Urinalysis Basic - ( 07 Sep 2019 16:07 )    Color: LIGHT YELLOW / Appearance: CLEAR / S.012 / pH: 6.0  Gluc: NEGATIVE / Ketone: NEGATIVE  / Bili: NEGATIVE / Urobili: NORMAL   Blood: MODERATE / Protein: 200 / Nitrite: NEGATIVE   Leuk Esterase: NEGATIVE / RBC: 11-25 / WBC 0-2   Sq Epi: OCC / Non Sq Epi: x / Bacteria: NEGATIVE

## 2019-09-08 NOTE — PROGRESS NOTE ADULT - SUBJECTIVE AND OBJECTIVE BOX
Postop Day  1 s/p   C- Section    THERAPY:  [X] Spinal morphine   [  ] Epidural morphine   [  ] IV PCA Hydromorphone 1 mg/ml      Sedation Score:	  [X] Alert	    [  ] Drowsy        [  ] Arousable	[  ] Asleep	[  ] Unresponsive    Side Effects:	  [X] None	     [  ] Nausea        [  ] Pruritus        [  ] Weakness   [  ] Numbness        ASSESSMENT/ PLAN   [   ] Discontinue         [X] Continue p.o. pain meds  [ x ]Documentation and Verification of current medications     Comments:   Patient tolerating p.o.s.  Not yet OOAA but moving in bed

## 2019-09-08 NOTE — PROGRESS NOTE ADULT - SUBJECTIVE AND OBJECTIVE BOX
SICU Consultation Note  =====================================================      HPI:  Patient is a 29 year old femlae  at 30.5 weeks GA who was sent from OB's private office for evaluation of elevated blood pressure in the office (158/88) and reports on sono today baby measuring SGA as per patient report.       ALLERGIES:  shellfish (Hives)      PAST MEDICAL & SURGICAL HISTORY:  No pertinent past medical history  No significant past surgical history    --------------------------------------------------------------------------------------    CURRENT MEDICATIONS:     Neurologic Medications  acetaminophen   Tablet .. 975 milliGRAM(s) Oral every 6 hours  acetaminophen  IVPB .. 1000 milliGRAM(s) IV Intermittent every 6 hours  diphenhydrAMINE 25 milliGRAM(s) Oral every 6 hours PRN Itching  levETIRAcetam  IVPB 500 milliGRAM(s) IV Intermittent every 12 hours  oxyCODONE    IR 5 milliGRAM(s) Oral every 3 hours PRN Moderate to Severe Pain (4-10)  oxyCODONE    IR 5 milliGRAM(s) Oral once PRN Moderate to Severe Pain (4-10)    Gastrointestinal Medications  dextrose 5%. 1000 milliLiter(s) IV Continuous <Continuous>  docusate sodium 100 milliGRAM(s) Oral two times a day PRN Stool softening  glycerin Suppository - Adult 1 Suppository(s) Rectal at bedtime PRN Constipation  lactated ringers. 1000 milliLiter(s) IV Continuous <Continuous>  magnesium hydroxide Suspension 30 milliLiter(s) Oral two times a day PRN Constipation  simethicone 80 milliGRAM(s) Chew every 4 hours PRN Gas    Genitourinary Medications  oxytocin Infusion 333.333 milliUNIT(s)/Min IV Continuous <Continuous>  oxytocin Infusion 333.333 milliUNIT(s)/Min IV Continuous <Continuous>    Hematologic/Oncologic Medications  diphtheria/tetanus/pertussis (acellular) Vaccine (ADAcel) 0.5 milliLiter(s) IntraMuscular once  heparin  Injectable 5000 Unit(s) SubCutaneous every 12 hours    Antimicrobial/Immunologic Medications  ertapenem  IVPB 1000 milliGRAM(s) IV Intermittent every 24 hours    Topical/Other Medications  lanolin Ointment 1 Application(s) Topical every 6 hours PRN Sore Nipples    --------------------------------------------------------------------------------------    VITAL SIGNS:  T(C): 34.4 (07 Sep 2019 20:30), Max: 36.6 (07 Sep 2019 06:58)  T(F): 93.9 (07 Sep 2019 20:30), Max: 97.88 (07 Sep 2019 06:58)  HR: 73 (07 Sep 2019 20:30) (59 - 87)  BP: 133/100 (07 Sep 2019 20:30) (109/66 - 155/89)  BP(mean): 109 (07 Sep 2019 20:30) (78 - 109)  RR: 19 (07 Sep 2019 20:30) (8 - 21)  SpO2: 99% (07 Sep 2019 20:30) (95% - 100%)    --------------------------------------------------------------------------------------    INS AND OUTS:    06 Sep 2019 07:01  -  07 Sep 2019 07:00  --------------------------------------------------------  IN:    Lactated Ringers IV Bolus: 350 mL    lactated ringers.: 300 mL    magnesium sulfate  Infusion: 350 mL  Total IN: 1000 mL    OUT:    Voided: 900 mL  Total OUT: 900 mL    Total NET: 100 mL      07 Sep 2019 07:01  -  08 Sep 2019 02:48  --------------------------------------------------------  IN:    IV PiggyBack: 450 mL    Other: 1300 mL  Total IN: 1750 mL    OUT:    Estimated Blood Loss: 700 mL    Indwelling Catheter - Urethral: 1315 mL    Other: 200 mL  Total OUT: 2215 mL    Total NET: -465 mL    --------------------------------------------------------------------------------------    EXAM    NEUROLOGY  Exam: Normal, NAD, alert, oriented x4.  No focal deficits.    HEENT  Exam: Normocephalic, atraumatic.    RESPIRATORY  Exam: Lungs clear to auscultation, Normal expansion / effort.    CARDIOVASCULAR  Exam: S1, S2.  Regular rate and rhythm.    GI/NUTRITION  Exam: Abdomen soft.  Mildly distended.  Appropriate incisional tenderness.  Incision clean, dry and intact.  Current Diet: Regular    VASCULAR  Exam: Extremities warm, pink, well-perfused.    MUSCULOSKELETAL  Exam: All extremities moving spontaneously without limitations.    SKIN:  Exam: Good skin turgor, no skin breakdown.      METABOLIC/FLUIDS/ELECTROLYTES  dextrose 5%. 1000 milliLiter(s) IV Continuous <Continuous>  lactated ringers. 1000 milliLiter(s) IV Continuous <Continuous>    HEMATOLOGIC  [x] DVT Prophylaxis: heparin  Injectable 5000 Unit(s) SubCutaneous every 12 hours    INFECTIOUS DISEASE  Antimicrobials/Immunologic Medications:  diphtheria/tetanus/pertussis (acellular) Vaccine (ADAcel) 0.5 milliLiter(s) IntraMuscular once  ertapenem  IVPB 1000 milliGRAM(s) IV Intermittent every 24 hours      TUBES / LINES / DRAINS:  [x] Peripheral IV  [] Central Venous Line     	[] R	[] L	[] IJ	[] Fem	[] SC	Date Placed:   [] Arterial Line		[] R	[] L	[] Fem	[] Rad	[] Ax	Date Placed:   [] PICC:         	[] Midline		[] Mediport  [x] Urinary Catheter		Date Placed:     --------------------------------------------------------------------------------------    LABS    CBC:                      10.7   6.90  )-----------( 242      ( 07 Sep 2019 21:10 )             34.3     CHEM:  132<L>  |  102  |  27<H>  ----------------------------<  95  5.7<H>   |  21<L>  |  1.12    Ca    8.5      07 Sep 2019 19:30  Phos  6.0       Mg     4.1         TPro  5.8<L>  /  Alb  2.8<L>  /  TBili  0.3  /  DBili  x   /  AST  44<H>  /  ALT  33  /  AlkPhos  122<H>      --------------------------------------------------------------------------------------

## 2019-09-08 NOTE — PROGRESS NOTE ADULT - ASSESSMENT
A/P: 29y  admitted at 30+weeks for sPEC w IUGR (7%), requiring multiple pushes of antihypertensives throughout the course of the week, developed hyperkalemia, started on treatment w insulin/D50, calcium, lasix, kayexalate, delivered via C/S for maternal indications (sPEC requiring several pushes of meds w hyperkalemia), post-operative course complicated by persistent hyperkalemia and hypothermia, was admitted to the SICU, started on Invanz.  In the SICU, continued w tx for hyperkalemia w insulin and lasix, potassium responding, BP meds switched to nicardipine and hydralazine, BPs now well controlled.  Stable.    # hyperkalemia  - responding to tx, most recent K 5.2  - cr 1.2, continue to trend  - c/w lasix BID  - magnesium, labetalol discontinued  - continue to trend labs  - appreciate further nephrology recs    # hypothermia  - resolved  - f/u BCx, UCx  - continue to trend temps, WBC  - c/w Invanz (-)    # sPEC  - c/w nicardipine, hydralazine  - continue to monitor BPs, and for s/sx sPEC  - most recent AST/ALT slightly elevated at 55/38, plts wnl, continue to trend HELLP labs  - s/p Keppra for 24 postpartum    # C/D  - c/w po pain meds prn  - continue to monitor incision for s/sx infection  - encourage ambulation, SHQ for VTE ppx  - c/w vuong for strict I&Os  - c/w regular diet as tolerating  - c/w bowel regimen    # dispo  - appreciate SICU care    Pt seen w Dr. Milton Gandhi PGY3

## 2019-09-09 LAB
ALBUMIN SERPL ELPH-MCNC: 2.8 G/DL — LOW (ref 3.3–5)
ALP SERPL-CCNC: 114 U/L — SIGNIFICANT CHANGE UP (ref 40–120)
ALT FLD-CCNC: 33 U/L — SIGNIFICANT CHANGE UP (ref 4–33)
ANION GAP SERPL CALC-SCNC: 11 MMO/L — SIGNIFICANT CHANGE UP (ref 7–14)
AST SERPL-CCNC: 44 U/L — HIGH (ref 4–32)
BACTERIA UR CULT: SIGNIFICANT CHANGE UP
BILIRUB SERPL-MCNC: 0.3 MG/DL — SIGNIFICANT CHANGE UP (ref 0.2–1.2)
BUN SERPL-MCNC: 30 MG/DL — HIGH (ref 7–23)
CA-I BLD-SCNC: 1.02 MMOL/L — LOW (ref 1.03–1.23)
CALCIUM SERPL-MCNC: 8.3 MG/DL — LOW (ref 8.4–10.5)
CHLORIDE SERPL-SCNC: 100 MMOL/L — SIGNIFICANT CHANGE UP (ref 98–107)
CO2 SERPL-SCNC: 24 MMOL/L — SIGNIFICANT CHANGE UP (ref 22–31)
CREAT SERPL-MCNC: 1.02 MG/DL — SIGNIFICANT CHANGE UP (ref 0.5–1.3)
GLUCOSE SERPL-MCNC: 89 MG/DL — SIGNIFICANT CHANGE UP (ref 70–99)
HCT VFR BLD CALC: 32.1 % — LOW (ref 34.5–45)
HGB BLD-MCNC: 9.8 G/DL — LOW (ref 11.5–15.5)
MAGNESIUM SERPL-MCNC: 2.5 MG/DL — SIGNIFICANT CHANGE UP (ref 1.6–2.6)
MCHC RBC-ENTMCNC: 24.9 PG — LOW (ref 27–34)
MCHC RBC-ENTMCNC: 30.5 % — LOW (ref 32–36)
MCV RBC AUTO: 81.7 FL — SIGNIFICANT CHANGE UP (ref 80–100)
NRBC # FLD: 0 K/UL — SIGNIFICANT CHANGE UP (ref 0–0)
PHOSPHATE SERPL-MCNC: 5 MG/DL — HIGH (ref 2.5–4.5)
PLATELET # BLD AUTO: 255 K/UL — SIGNIFICANT CHANGE UP (ref 150–400)
PMV BLD: 11.4 FL — SIGNIFICANT CHANGE UP (ref 7–13)
POTASSIUM SERPL-MCNC: 4.5 MMOL/L — SIGNIFICANT CHANGE UP (ref 3.5–5.3)
POTASSIUM SERPL-SCNC: 4.5 MMOL/L — SIGNIFICANT CHANGE UP (ref 3.5–5.3)
PROT SERPL-MCNC: 5.8 G/DL — LOW (ref 6–8.3)
RBC # BLD: 3.93 M/UL — SIGNIFICANT CHANGE UP (ref 3.8–5.2)
RBC # FLD: 16.6 % — HIGH (ref 10.3–14.5)
SODIUM SERPL-SCNC: 135 MMOL/L — SIGNIFICANT CHANGE UP (ref 135–145)
SPECIMEN SOURCE: SIGNIFICANT CHANGE UP
WBC # BLD: 10.28 K/UL — SIGNIFICANT CHANGE UP (ref 3.8–10.5)
WBC # FLD AUTO: 10.28 K/UL — SIGNIFICANT CHANGE UP (ref 3.8–10.5)

## 2019-09-09 PROCEDURE — 99232 SBSQ HOSP IP/OBS MODERATE 35: CPT | Mod: GC

## 2019-09-09 PROCEDURE — 99231 SBSQ HOSP IP/OBS SF/LOW 25: CPT

## 2019-09-09 RX ORDER — OXYCODONE HYDROCHLORIDE 5 MG/1
10 TABLET ORAL
Refills: 0 | Status: DISCONTINUED | OUTPATIENT
Start: 2019-09-09 | End: 2019-09-12

## 2019-09-09 RX ORDER — ACETAMINOPHEN 500 MG
975 TABLET ORAL EVERY 6 HOURS
Refills: 0 | Status: DISCONTINUED | OUTPATIENT
Start: 2019-09-09 | End: 2019-09-12

## 2019-09-09 RX ORDER — OXYCODONE HYDROCHLORIDE 5 MG/1
5 TABLET ORAL
Refills: 0 | Status: DISCONTINUED | OUTPATIENT
Start: 2019-09-09 | End: 2019-09-12

## 2019-09-09 RX ADMIN — NICARDIPINE HYDROCHLORIDE 30 MILLIGRAM(S): 30 CAPSULE, EXTENDED RELEASE ORAL at 05:53

## 2019-09-09 RX ADMIN — Medication 975 MILLIGRAM(S): at 13:00

## 2019-09-09 RX ADMIN — Medication 975 MILLIGRAM(S): at 06:15

## 2019-09-09 RX ADMIN — Medication 50 MILLIGRAM(S): at 20:20

## 2019-09-09 RX ADMIN — HEPARIN SODIUM 5000 UNIT(S): 5000 INJECTION INTRAVENOUS; SUBCUTANEOUS at 17:43

## 2019-09-09 RX ADMIN — Medication 975 MILLIGRAM(S): at 12:23

## 2019-09-09 RX ADMIN — HEPARIN SODIUM 5000 UNIT(S): 5000 INJECTION INTRAVENOUS; SUBCUTANEOUS at 05:53

## 2019-09-09 RX ADMIN — NICARDIPINE HYDROCHLORIDE 30 MILLIGRAM(S): 30 CAPSULE, EXTENDED RELEASE ORAL at 22:30

## 2019-09-09 RX ADMIN — Medication 50 MILLIGRAM(S): at 12:16

## 2019-09-09 RX ADMIN — Medication 50 MILLIGRAM(S): at 03:56

## 2019-09-09 RX ADMIN — Medication 975 MILLIGRAM(S): at 18:40

## 2019-09-09 RX ADMIN — Medication 975 MILLIGRAM(S): at 17:43

## 2019-09-09 RX ADMIN — NICARDIPINE HYDROCHLORIDE 30 MILLIGRAM(S): 30 CAPSULE, EXTENDED RELEASE ORAL at 15:31

## 2019-09-09 NOTE — PROGRESS NOTE ADULT - ASSESSMENT
30 yo  POD#2 from pLTCS for worsening severe preeclampsia c/b hyperkalemia, hypothermia. Clinical condition improving with improvement of serum potassium.

## 2019-09-09 NOTE — PROGRESS NOTE ADULT - SUBJECTIVE AND OBJECTIVE BOX
SICU Daily Progress Note  =====================================================  Interval/Overnight Events:     - No acute events overnight, patient without complaints  - No episodes of hypothermia overnight  - Afebrile, no leukocytosis, HD stable, low concern for sepsis  - Potassium today, receiving Lasix 40mg IVP BID     POD #    2      	SICU Day #  3    HPI:  Patient is a 29 year old femlae  at 30.5 weeks GA who was sent from OB's private office on  for evaluation of elevated blood pressure in the office (158/88) and reports on sono baby measuring SGA as per patient report. Patient is now s/p uncomplicated  on  with EBL 700cc, IVF 1300cc, no blood products administered. Post operatively, patient was found to be hypothermic to 93F and hyperkalemic to 6.2. SICU was consulted for concern for sepsis in setting of hypothermia, hyperkalemia in setting of FARIHA vs. hemolysis vs pseudohyperkalemia      Allergies: No Known Drug Allergies  shellfish (Hives)      MEDICATIONS:   --------------------------------------------------------------------------------------  Neurologic Medications  acetaminophen   Tablet .. 975 milliGRAM(s) Oral every 6 hours  acetaminophen  IVPB .. 1000 milliGRAM(s) IV Intermittent every 6 hours  diphenhydrAMINE 25 milliGRAM(s) Oral every 6 hours PRN Itching  oxyCODONE    IR 5 milliGRAM(s) Oral every 3 hours PRN Moderate to Severe Pain (4-10)  oxyCODONE    IR 5 milliGRAM(s) Oral once PRN Moderate to Severe Pain (4-10)    Respiratory Medications    Cardiovascular Medications  furosemide   Injectable 40 milliGRAM(s) IV Push two times a day  hydrALAZINE 50 milliGRAM(s) Oral every 8 hours  niCARdipine 30 milliGRAM(s) Oral every 8 hours    Gastrointestinal Medications  docusate sodium 100 milliGRAM(s) Oral two times a day PRN Stool softening  glycerin Suppository - Adult 1 Suppository(s) Rectal at bedtime PRN Constipation  magnesium hydroxide Suspension 30 milliLiter(s) Oral two times a day PRN Constipation  simethicone 80 milliGRAM(s) Chew every 4 hours PRN Gas    Genitourinary Medications    Hematologic/Oncologic Medications  diphtheria/tetanus/pertussis (acellular) Vaccine (ADAcel) 0.5 milliLiter(s) IntraMuscular once  heparin  Injectable 5000 Unit(s) SubCutaneous every 12 hours  influenza   Vaccine 0.5 milliLiter(s) IntraMuscular once    Antimicrobial/Immunologic Medications    Endocrine/Metabolic Medications    Topical/Other Medications  chlorhexidine 4% Liquid 1 Application(s) Topical <User Schedule>  lanolin Ointment 1 Application(s) Topical every 6 hours PRN Sore Nipples    --------------------------------------------------------------------------------------    VITAL SIGNS, INS/OUTS (last 24 hours):  --------------------------------------------------------------------------------------  T(C): 36.9 (19 @ 20:00), Max: 37.2 (19 @ 12:00)  HR: 89 (19 @ 23:00) (64 - 95)  BP: 131/71 (19 @ 23:00) (123/72 - 158/86)  BP(mean): 85 (19 @ 23:00) (82 - 112)  ABP: --  ABP(mean): --  RR: 13 (19 @ 23:00) ()  SpO2: 98% (19 @ 23:00) (95% - 99%)  Wt(kg): --  CVP(mm Hg): --  CI: --  CAPILLARY BLOOD GLUCOSE      POCT Blood Glucose.: 80 mg/dL (08 Sep 2019 07:44)   N/A       @ 07:01  -   @ 07:00  --------------------------------------------------------  IN:    IV PiggyBack: 650 mL    Lactated Ringers IV Bolus: 1500 mL    lactated ringers.: 625 mL    Other: 1300 mL  Total IN: 4075 mL    OUT:    Estimated Blood Loss: 700 mL    Indwelling Catheter - Urethral: 1438 mL    Other: 200 mL  Total OUT: 2338 mL    Total NET: 1737 mL       @ 07:  -   @ 00:42  --------------------------------------------------------  IN:    IV PiggyBack: 200 mL    Oral Fluid: 600 mL  Total IN: 800 mL    OUT:    Indwelling Catheter - Urethral: 4105 mL  Total OUT: 4105 mL    Total NET: -3305 mL        --------------------------------------------------------------------------------------  EXAM    NEUROLOGY  Exam: Normal, NAD, alert, oriented x4.  No focal deficits.    HEENT  Exam: Normocephalic, atraumatic.    RESPIRATORY  Exam: Lungs clear to auscultation, Normal expansion / effort.    CARDIOVASCULAR  Exam: S1, S2.  Regular rate and rhythm.    GI/NUTRITION  Exam: Abdomen soft.  Mildly distended.  Appropriate incisional tenderness.  Incision clean, dry and intact.  Current Diet: Regular    VASCULAR  Exam: Extremities warm, pink, well-perfused.    MUSCULOSKELETAL  Exam: All extremities moving spontaneously without limitations.    SKIN:  Exam: Good skin turgor, no skin breakdown.  METABOLIC/FLUIDS/ELECTROLYTES      HEMATOLOGIC  [x] VTE Prophylaxis: heparin  Injectable 5000 Unit(s) SubCutaneous every 12 hours    Transfusions:	[] PRBC	[] Platelets		[] FFP	[] Cryoprecipitate    INFECTIOUS DISEASE  Antimicrobials/Immunologic Medications:  diphtheria/tetanus/pertussis (acellular) Vaccine (ADAcel) 0.5 milliLiter(s) IntraMuscular once  influenza   Vaccine 0.5 milliLiter(s) IntraMuscular once      Tubes/Lines/Drains   [x] Peripheral IV  [] Central Venous Line     	[] R	[] L	[] IJ	[] Fem	[] SC	Date Placed:   [] Arterial Line		[] R	[] L	[] Fem	[] Rad	[] Ax	Date Placed:   [] PICC		[] Midline		[] Mediport  [x] Urinary Catheter		Date Placed:   [x] Necessity of urinary, arterial, and venous catheters discussed    LABS  --------------------------------------------------------------------------------------                        9.8    10.28 )-----------( 255      ( 09 Sep 2019 00:09 )             32.1       137  |  103  |  32<H>  ----------------------------<  85  5.2   |  22  |  1.21    Ca    8.9      08 Sep 2019 16:55  Phos  5.5       Mg     2.9         TPro  6.1  /  Alb  3.0<L>  /  TBili  0.3  /  DBili  x   /  AST  55<H>  /  ALT  38<H>  /  AlkPhos  122<H>      --------------------------------------------------------------------------------------    OTHER LABORATORY:     IMAGING STUDIES:   CXR:     Patient is a 29 year old femlae  at 30.5 weeks GA who was sent from OB's private office for evaluation of elevated blood pressure in the office (158/88) and reports on sono today baby measuring SGA as per patient report.  Patient now S/P  on 19 with concern for post-operative sepsis in setting of hypothermia, hyperkalemia in setting of FARIHA vs. hemolysis vs pseudohyperkalemia      PLAN:    NEUROLOGY:  - Continue with Acetaminophen and Oxycodone PRN for pain control  - Continue with IV Keppra for seizure prophylaxis in setting of pre-eclampsia    RESPIRATORY:  - No active issues  - Saturating well on room air  - Maintain O2 saturation >92%    CARDIOVASCULAR:  - Pre-eclampsia, goal SBP < 160s  - Discontinued Labetalol and Nifedipine as may inhibit RAAS  - c/w Hydralazine 50 Q8H and Nicardipine 30 mg Q8H  - Keep MAP >65    GI / NUTRITION:  - Regular diet    RENAL / GENITOURINARY:  - *** Hyperkalemia now resolving- FARIHA vs hemolysis vs. pseudohyperkalemia   - C/w Lasix 40 mg IV BID  - d/c Alonso today  - Strict Is/Os    HEMATOLOGIC:  - Platelets currently 255K, low concern for HELLP syndrome  - H/H stable at 9.8/32.1  - VTE ppx: SQH    INFECTIOUS DISEASE:  - No additional episodes of hypothermia, Tmin 96, Tmax 99  - low suspicion of sepsis, continue to monitor off ABX  - WBC 10.28 down from 10.92    ENDOCRINOLOGY:  - No active issues  - Continue to monitor glucose on BMP (goal 140-180)      Disposition: floors SICU Daily Progress Note  =====================================================  Interval/Overnight Events:     - No acute events overnight, patient without complaints  - No episodes of hypothermia overnight  - Afebrile, no leukocytosis, HD stable, low concern for sepsis  - Potassium 4.5 today, will d/c Lasix 40mg IVP BID     POD #    2      	SICU Day #  3    HPI:  Patient is a 29 year old femlae  at 30.5 weeks GA who was sent from OB's private office on  for evaluation of elevated blood pressure in the office (158/88) and reports on sono baby measuring SGA as per patient report. Patient is now s/p uncomplicated  on  with EBL 700cc, IVF 1300cc, no blood products administered. Post operatively, patient was found to be hypothermic to 93F and hyperkalemic to 6.2. SICU was consulted for concern for sepsis in setting of hypothermia, hyperkalemia in setting of FARIHA vs. hemolysis vs pseudohyperkalemia      Allergies: No Known Drug Allergies  shellfish (Hives)      MEDICATIONS:   --------------------------------------------------------------------------------------  Neurologic Medications  acetaminophen   Tablet .. 975 milliGRAM(s) Oral every 6 hours  acetaminophen  IVPB .. 1000 milliGRAM(s) IV Intermittent every 6 hours  diphenhydrAMINE 25 milliGRAM(s) Oral every 6 hours PRN Itching  oxyCODONE    IR 5 milliGRAM(s) Oral every 3 hours PRN Moderate to Severe Pain (4-10)  oxyCODONE    IR 5 milliGRAM(s) Oral once PRN Moderate to Severe Pain (4-10)    Respiratory Medications    Cardiovascular Medications  furosemide   Injectable 40 milliGRAM(s) IV Push two times a day  hydrALAZINE 50 milliGRAM(s) Oral every 8 hours  niCARdipine 30 milliGRAM(s) Oral every 8 hours    Gastrointestinal Medications  docusate sodium 100 milliGRAM(s) Oral two times a day PRN Stool softening  glycerin Suppository - Adult 1 Suppository(s) Rectal at bedtime PRN Constipation  magnesium hydroxide Suspension 30 milliLiter(s) Oral two times a day PRN Constipation  simethicone 80 milliGRAM(s) Chew every 4 hours PRN Gas    Genitourinary Medications    Hematologic/Oncologic Medications  diphtheria/tetanus/pertussis (acellular) Vaccine (ADAcel) 0.5 milliLiter(s) IntraMuscular once  heparin  Injectable 5000 Unit(s) SubCutaneous every 12 hours  influenza   Vaccine 0.5 milliLiter(s) IntraMuscular once    Antimicrobial/Immunologic Medications    Endocrine/Metabolic Medications    Topical/Other Medications  chlorhexidine 4% Liquid 1 Application(s) Topical <User Schedule>  lanolin Ointment 1 Application(s) Topical every 6 hours PRN Sore Nipples    --------------------------------------------------------------------------------------    VITAL SIGNS, INS/OUTS (last 24 hours):  --------------------------------------------------------------------------------------  T(C): 36.9 (19 @ 20:00), Max: 37.2 (19 @ 12:00)  HR: 89 (19 @ 23:00) (64 - 95)  BP: 131/71 (19 @ 23:00) (123/72 - 158/86)  BP(mean): 85 (19 @ 23:00) (82 - 112)  ABP: --  ABP(mean): --  RR: 13 (19 @ 23:00) (9 - 19)  SpO2: 98% (19 @ 23:00) (95% - 99%)  Wt(kg): --  CVP(mm Hg): --  CI: --  CAPILLARY BLOOD GLUCOSE      POCT Blood Glucose.: 80 mg/dL (08 Sep 2019 07:44)   N/A       @ 07:01  -   @ 07:00  --------------------------------------------------------  IN:    IV PiggyBack: 650 mL    Lactated Ringers IV Bolus: 1500 mL    lactated ringers.: 625 mL    Other: 1300 mL  Total IN: 4075 mL    OUT:    Estimated Blood Loss: 700 mL    Indwelling Catheter - Urethral: 1438 mL    Other: 200 mL  Total OUT: 2338 mL    Total NET: 1737 mL       @ 07:  -   @ 00:42  --------------------------------------------------------  IN:    IV PiggyBack: 200 mL    Oral Fluid: 600 mL  Total IN: 800 mL    OUT:    Indwelling Catheter - Urethral: 4105 mL  Total OUT: 4105 mL    Total NET: -3305 mL        --------------------------------------------------------------------------------------  EXAM    NEUROLOGY  Exam: Normal, NAD, alert, oriented x4.  No focal deficits.    HEENT  Exam: Normocephalic, atraumatic.    RESPIRATORY  Exam: Lungs clear to auscultation, Normal expansion / effort.    CARDIOVASCULAR  Exam: S1, S2.  Regular rate and rhythm.    GI/NUTRITION  Exam: Abdomen soft.  Mildly distended.  Appropriate incisional tenderness.  Incision clean, dry and intact.  Current Diet: Regular    VASCULAR  Exam: Extremities warm, pink, well-perfused.    MUSCULOSKELETAL  Exam: All extremities moving spontaneously without limitations.    SKIN:  Exam: Good skin turgor, no skin breakdown.  METABOLIC/FLUIDS/ELECTROLYTES      HEMATOLOGIC  [x] VTE Prophylaxis: heparin  Injectable 5000 Unit(s) SubCutaneous every 12 hours    Transfusions:	[] PRBC	[] Platelets		[] FFP	[] Cryoprecipitate    INFECTIOUS DISEASE  Antimicrobials/Immunologic Medications:  diphtheria/tetanus/pertussis (acellular) Vaccine (ADAcel) 0.5 milliLiter(s) IntraMuscular once  influenza   Vaccine 0.5 milliLiter(s) IntraMuscular once      Tubes/Lines/Drains   [x] Peripheral IV  [] Central Venous Line     	[] R	[] L	[] IJ	[] Fem	[] SC	Date Placed:   [] Arterial Line		[] R	[] L	[] Fem	[] Rad	[] Ax	Date Placed:   [] PICC		[] Midline		[] Mediport  [x] Urinary Catheter		Date Placed:   [x] Necessity of urinary, arterial, and venous catheters discussed    LABS  --------------------------------------------------------------------------------------                        9.8    10.28 )-----------( 255      ( 09 Sep 2019 00:09 )             32.1       135  |  100  |  30<H>  ----------------------------<  89  4.5   |  24  |  1.02    Ca    8.3<L>      09 Sep 2019 00:09  Phos  5.0       Mg     2.5         TPro  5.8<L>  /  Alb  2.8<L>  /  TBili  0.3  /  DBili  x   /  AST  44<H>  /  ALT  33  /  AlkPhos  114        --------------------------------------------------------------------------------------    OTHER LABORATORY:     IMAGING STUDIES:   CXR:     Patient is a 29 year old femlae  at 30.5 weeks GA who was sent from OB's private office for evaluation of elevated blood pressure in the office (158/88) and reports on sono today baby measuring SGA as per patient report.  Patient now S/P  on 19 with concern for post-operative sepsis in setting of hypothermia, hyperkalemia in setting of FARIHA vs. hemolysis vs pseudohyperkalemia.      PLAN:    NEUROLOGY:  - Continue with Acetaminophen and Oxycodone PRN for pain control  - Continue with IV Keppra for seizure prophylaxis in setting of pre-eclampsia    RESPIRATORY:  - No active issues  - Saturating well on room air  - Maintain O2 saturation >92%    CARDIOVASCULAR:  - Pre-eclampsia, goal SBP < 160s  - Discontinued Labetalol and Nifedipine as may inhibit RAAS  - c/w Hydralazine 50 Q8H and Nicardipine 30 mg Q8H  - Keep MAP >65    GI / NUTRITION:  - Regular diet    RENAL / GENITOURINARY:  - Hyperkalemia now resolved, K 4.5.  FARIHA vs hemolysis vs. pseudohyperkalemia   - d/c Lasix 40 mg IV BID  - d/c Alonso today  - Strict Is/Os    HEMATOLOGIC:  - Platelets currently 255K, low concern for HELLP syndrome  - H/H stable at 9.8/32.1  - VTE ppx: SQH    INFECTIOUS DISEASE:  - No additional episodes of hypothermia, Tmin 96, Tmax 99  - low suspicion of sepsis, continue to monitor off ABX  - WBC 10.28 down from 10.92    ENDOCRINOLOGY:  - No active issues  - Continue to monitor glucose on BMP (goal 140-180)      Disposition: floors SICU Daily Progress Note  =====================================================  Interval/Overnight Events:     - No acute events overnight, patient without complaints  - No episodes of hypothermia overnight  - Afebrile, no leukocytosis, HD stable, low concern for sepsis  - Potassium 4.5 today, will d/c Lasix 40mg IVP BID     POD #    2      	SICU Day #  3    HPI:  Patient is a 29 year old femlae  at 30.5 weeks GA who was sent from OB's private office on  for evaluation of elevated blood pressure in the office (158/88) and reports on sono baby measuring SGA as per patient report. Patient is now s/p uncomplicated  on  with EBL 700cc, IVF 1300cc, no blood products administered. Post operatively, patient was found to be hypothermic to 93F and hyperkalemic to 6.2. SICU was consulted for concern for sepsis in setting of hypothermia, hyperkalemia in setting of FARIHA vs. hemolysis vs pseudohyperkalemia      Allergies: No Known Drug Allergies  shellfish (Hives)      MEDICATIONS:   --------------------------------------------------------------------------------------  Neurologic Medications  acetaminophen   Tablet .. 975 milliGRAM(s) Oral every 6 hours  acetaminophen  IVPB .. 1000 milliGRAM(s) IV Intermittent every 6 hours  diphenhydrAMINE 25 milliGRAM(s) Oral every 6 hours PRN Itching  oxyCODONE    IR 5 milliGRAM(s) Oral every 3 hours PRN Moderate to Severe Pain (4-10)  oxyCODONE    IR 5 milliGRAM(s) Oral once PRN Moderate to Severe Pain (4-10)    Respiratory Medications    Cardiovascular Medications  furosemide   Injectable 40 milliGRAM(s) IV Push two times a day  hydrALAZINE 50 milliGRAM(s) Oral every 8 hours  niCARdipine 30 milliGRAM(s) Oral every 8 hours    Gastrointestinal Medications  docusate sodium 100 milliGRAM(s) Oral two times a day PRN Stool softening  glycerin Suppository - Adult 1 Suppository(s) Rectal at bedtime PRN Constipation  magnesium hydroxide Suspension 30 milliLiter(s) Oral two times a day PRN Constipation  simethicone 80 milliGRAM(s) Chew every 4 hours PRN Gas    Genitourinary Medications    Hematologic/Oncologic Medications  diphtheria/tetanus/pertussis (acellular) Vaccine (ADAcel) 0.5 milliLiter(s) IntraMuscular once  heparin  Injectable 5000 Unit(s) SubCutaneous every 12 hours  influenza   Vaccine 0.5 milliLiter(s) IntraMuscular once    Antimicrobial/Immunologic Medications    Endocrine/Metabolic Medications    Topical/Other Medications  chlorhexidine 4% Liquid 1 Application(s) Topical <User Schedule>  lanolin Ointment 1 Application(s) Topical every 6 hours PRN Sore Nipples    --------------------------------------------------------------------------------------    VITAL SIGNS, INS/OUTS (last 24 hours):  --------------------------------------------------------------------------------------  T(C): 36.9 (19 @ 20:00), Max: 37.2 (19 @ 12:00)  HR: 89 (19 @ 23:00) (64 - 95)  BP: 131/71 (19 @ 23:00) (123/72 - 158/86)  BP(mean): 85 (19 @ 23:00) (82 - 112)  ABP: --  ABP(mean): --  RR: 13 (19 @ 23:00) (9 - 19)  SpO2: 98% (19 @ 23:00) (95% - 99%)  Wt(kg): --  CVP(mm Hg): --  CI: --  CAPILLARY BLOOD GLUCOSE      POCT Blood Glucose.: 80 mg/dL (08 Sep 2019 07:44)   N/A       @ 07:01  -   @ 07:00  --------------------------------------------------------  IN:    IV PiggyBack: 650 mL    Lactated Ringers IV Bolus: 1500 mL    lactated ringers.: 625 mL    Other: 1300 mL  Total IN: 4075 mL    OUT:    Estimated Blood Loss: 700 mL    Indwelling Catheter - Urethral: 1438 mL    Other: 200 mL  Total OUT: 2338 mL    Total NET: 1737 mL       @ 07:  -   @ 00:42  --------------------------------------------------------  IN:    IV PiggyBack: 200 mL    Oral Fluid: 600 mL  Total IN: 800 mL    OUT:    Indwelling Catheter - Urethral: 4105 mL  Total OUT: 4105 mL    Total NET: -3305 mL        --------------------------------------------------------------------------------------  EXAM    NEUROLOGY  Exam: Normal, NAD, alert, oriented x4.  No focal deficits.    HEENT  Exam: Normocephalic, atraumatic.    RESPIRATORY  Exam: Lungs clear to auscultation, Normal expansion / effort.    CARDIOVASCULAR  Exam: S1, S2.  Regular rate and rhythm.    GI/NUTRITION  Exam: Abdomen soft.  Mildly distended.  Appropriate incisional tenderness.  Incision clean, dry and intact.  Current Diet: Regular    VASCULAR  Exam: Extremities warm, pink, well-perfused.    MUSCULOSKELETAL  Exam: All extremities moving spontaneously without limitations.    SKIN:  Exam: Good skin turgor, no skin breakdown.  METABOLIC/FLUIDS/ELECTROLYTES      HEMATOLOGIC  [x] VTE Prophylaxis: heparin  Injectable 5000 Unit(s) SubCutaneous every 12 hours    Transfusions:	[] PRBC	[] Platelets		[] FFP	[] Cryoprecipitate    INFECTIOUS DISEASE  Antimicrobials/Immunologic Medications:  diphtheria/tetanus/pertussis (acellular) Vaccine (ADAcel) 0.5 milliLiter(s) IntraMuscular once  influenza   Vaccine 0.5 milliLiter(s) IntraMuscular once      Tubes/Lines/Drains   [x] Peripheral IV  [] Central Venous Line     	[] R	[] L	[] IJ	[] Fem	[] SC	Date Placed:   [] Arterial Line		[] R	[] L	[] Fem	[] Rad	[] Ax	Date Placed:   [] PICC		[] Midline		[] Mediport  [x] Urinary Catheter		Date Placed:   [x] Necessity of urinary, arterial, and venous catheters discussed    LABS  --------------------------------------------------------------------------------------                        9.8    10.28 )-----------( 255      ( 09 Sep 2019 00:09 )             32.1       135  |  100  |  30<H>  ----------------------------<  89  4.5   |  24  |  1.02    Ca    8.3<L>      09 Sep 2019 00:09  Phos  5.0       Mg     2.5         TPro  5.8<L>  /  Alb  2.8<L>  /  TBili  0.3  /  DBili  x   /  AST  44<H>  /  ALT  33  /  AlkPhos  114        --------------------------------------------------------------------------------------    OTHER LABORATORY:     IMAGING STUDIES:   CXR:     Patient is a 29 year old femlae  at 30.5 weeks GA who was sent from OB's private office for evaluation of elevated blood pressure in the office (158/88) and reports on sono today baby measuring SGA as per patient report.  Patient now S/P  on 19 with concern for post-operative sepsis in setting of hypothermia, hyperkalemia in setting of FARIHA vs. hemolysis vs pseudohyperkalemia.      PLAN:    NEUROLOGY:  - Continue with Acetaminophen and Oxycodone PRN for pain control  - s/p IV Keppra for seizure prophylaxis in setting of pre-eclampsia- no further dosing required    RESPIRATORY:  - No active issues  - Saturating well on room air  - Maintain O2 saturation >92%    CARDIOVASCULAR:  - Pre-eclampsia, goal SBP < 160s  - Discontinued Labetalol and Nifedipine as may inhibit RAAS  - c/w Hydralazine 50 Q8H and Nicardipine 30 mg Q8H  - Keep MAP >65    GI / NUTRITION:  - Regular diet    RENAL / GENITOURINARY:  - Hyperkalemia now resolved, K 4.5.  FARIHA vs hemolysis vs. pseudohyperkalemia   - d/c Lasix 40 mg IV BID  - d/c Alonso today  - Strict Is/Os    HEMATOLOGIC:  - Platelets currently 255K, low concern for HELLP syndrome  - H/H stable at 9.8/32.1  - VTE ppx: SQH    INFECTIOUS DISEASE:  - No additional episodes of hypothermia, Tmin 96, Tmax 99  - low suspicion of sepsis, continue to monitor off ABX  - WBC 10.28 down from 10.92    ENDOCRINOLOGY:  - No active issues  - Continue to monitor glucose on BMP (goal 140-180)      Disposition: floors

## 2019-09-09 NOTE — PROGRESS NOTE ADULT - SUBJECTIVE AND OBJECTIVE BOX
[facility] DIVISION OF KIDNEY DISEASES AND HYPERTENSION -- FOLLOW UP NOTE  --------------------------------------------------------------------------------  HPI: 30 yo F with no previous medical history admitted with pre-eclampsia. Nephrology team is following for hyperkalemia. Pt. admitted with pre-eclampsia on 8/31/19 treated with labetalol and nifedipine. Pt. states has been eating fruits (bananas, peaches) during stay. Of note, pt. on LR as IVF. Pt. with no previous history of kidney disease or hyperkalemia. On lab review of [facility] Scr WNL (0.87) on 8/31/19 and slowly increase at 0.94 on 9/4/19 and 1.01 on 9/7/19. Serum potassium elevated at 5.9 on 9/6/19 and further rise at 7.3 on 9/7/19. 24-hr protein showed 3.3 g and UA with +RBC. Pt. underwent medical management for hyperkalemia. Serum potassium earlier this AM is normal at 4.5.     Pt. currently feels well, offers no complains. Denies CP, abdominal pain, nausea, vomiting, diarrhea, headache, dizziness.    PAST HISTORY  --------------------------------------------------------------------------------  No significant changes to PMH, PSH, FHx, SHx, unless otherwise noted    ALLERGIES & MEDICATIONS  --------------------------------------------------------------------------------  Allergies    No Known Drug Allergies  shellfish (Hives)    Intolerances    Standing Inpatient Medications  acetaminophen   Tablet .. 975 milliGRAM(s) Oral every 6 hours  diphtheria/tetanus/pertussis (acellular) Vaccine (ADAcel) 0.5 milliLiter(s) IntraMuscular once  heparin  Injectable 5000 Unit(s) SubCutaneous every 12 hours  hydrALAZINE 50 milliGRAM(s) Oral every 8 hours  influenza   Vaccine 0.5 milliLiter(s) IntraMuscular once  niCARdipine 30 milliGRAM(s) Oral every 8 hours    REVIEW OF SYSTEMS  --------------------------------------------------------------------------------  Gen: No fevers/chills  Skin: No rashes  Head/Eyes/Ears: Normal hearing,   Respiratory: No dyspnea, cough  CV: No chest pain  GI: No abdominal pain, diarrhea  : No dysuria, hematuria  MSK: No  edema  Heme: No easy bruising or bleeding  Psych: No significant depression    All other systems were reviewed and are negative, except as noted.    VITALS/PHYSICAL EXAM  --------------------------------------------------------------------------------  T(C): 36.9 (09-09-19 @ 04:00), Max: 37.2 (09-08-19 @ 12:00)  HR: 88 (09-09-19 @ 06:00) (70 - 95)  BP: 141/87 (09-09-19 @ 06:00) (123/72 - 157/97)  RR: 16 (09-09-19 @ 06:00) (12 - 16)  SpO2: 99% (09-09-19 @ 06:00) (95% - 99%)  Wt(kg): --    09-08-19 @ 07:01  -  09-09-19 @ 07:00  --------------------------------------------------------  IN: 800 mL / OUT: 4905 mL / NET: -4105 mL    Physical Exam:  	Gen: NAD  	HEENT: MMM  	Pulm: CTA B/L  	CV: S1S2  	Abd: Soft, +BS  	Ext: Trace LE edema B/L  	Neuro: Awake  	Skin: Warm and dry  LABS/STUDIES  --------------------------------------------------------------------------------              9.8    10.28 >-----------<  255      [09-09-19 @ 00:09]              32.1     135  |  100  |  30  ----------------------------<  89      [09-09-19 @ 00:09]  4.5   |  24  |  1.02        Ca     8.3     [09-09-19 @ 00:09]      iCa    1.02     [09-09 @ 00:09]      Mg     2.5     [09-09-19 @ 00:09]      Phos  5.0     [09-09-19 @ 00:09]    Creatinine Trend:  SCr 1.02 [09-09 @ 00:09]  SCr 1.21 [09-08 @ 16:55]  SCr 1.24 [09-08 @ 04:35]  SCr 1.12 [09-07 @ 19:30]  SCr 1.07 [09-07 @ 15:56]    Urinalysis - [09-07-19 @ 16:07]      Color LIGHT YELLOW / Appearance CLEAR / SG 1.012 / pH 6.0      Gluc NEGATIVE / Ketone NEGATIVE  / Bili NEGATIVE / Urobili NORMAL       Blood MODERATE / Protein 200 / Leuk Est NEGATIVE / Nitrite NEGATIVE      RBC 11-25 / WBC 0-2 / Hyaline NEGATIVE / Gran  / Sq Epi OCC / Non Sq Epi  / Bacteria NEGATIVE    Urine Sodium 25      [09-08-19 @ 06:19]  Urine Potassium > 100.0      [09-08-19 @ 06:19]  Urine Chloride < 20      [09-08-19 @ 06:19]

## 2019-09-09 NOTE — PROGRESS NOTE ADULT - SUBJECTIVE AND OBJECTIVE BOX
R3 Postpartum Progress Note - HD#10, POD#2    24 hr events: Patient received treatment for hyperkalemia now s/p kayexalate and insulin and blood pressure medications were transitioned to oral antihypertensives.    SUBJECTIVE:  Patient seen and examined at bedside. No acute complaints at this time. She reports soreness around her incisional site, but denies pain. She has minimally ambulated and is tolerating po. She has not yet passed flatus and vuong is in place. Denies fevers, chills, sweats, CP, SOB, palpitations. Vaginal bleeding is appropriate.     OBJECTIVE:   ICU Vital Signs Last 24 Hrs  T(C): 36.9 (08 Sep 2019 20:00), Max: 37.2 (08 Sep 2019 12:00)  T(F): 98.5 (08 Sep 2019 20:00), Max: 99 (08 Sep 2019 12:00)  HR: 89 (08 Sep 2019 23:00) (64 - 95)  BP: 131/71 (08 Sep 2019 23:00) (123/72 - 158/86)  BP(mean): 85 (08 Sep 2019 23:00) (82 - 112)  ABP: --  ABP(mean): --  RR: 13 (08 Sep 2019 23:00) (11 - 19)  SpO2: 98% (08 Sep 2019 23:00) (95% - 99%)      Physical Exam:  General: NAD  Pulmonary: Nonlabored breathing, no respiratory distress, CTA-B  Cardiovascular: NSR, no murmurs  Abdominal: soft, NT/ND, +BS, no Pfannenstiel incision, C/D/I, steri strips  : pad with minimal blood, vuong in situ with clear yellow urine in bag  Ext: no edema/tenderness in LE b/l    I&O's Summary    07 Sep 2019 07:  -  08 Sep 2019 07:00  --------------------------------------------------------  IN: 4075 mL / OUT: 2338 mL / NET: 1737 mL    08 Sep 2019 07:  -  09 Sep 2019 01:03  --------------------------------------------------------  IN: 800 mL / OUT: 4105 mL / NET: -3305 mL        LABS:                        9.8    10.28 )-----------( 255      ( 09 Sep 2019 00:09 )             32.1         137  |  103  |  32<H>  ----------------------------<  85  5.2   |  22  |  1.21    Ca    8.9      08 Sep 2019 16:55  Phos  5.5       Mg     2.9         TPro  6.1  /  Alb  3.0<L>  /  TBili  0.3  /  DBili  x   /  AST  55<H>  /  ALT  38<H>  /  AlkPhos  122<H>      PT/INR - ( 08 Sep 2019 16:55 )   PT: 13.0 SEC;   INR: 1.14          PTT - ( 08 Sep 2019 16:55 )  PTT:26.5 SEC  Urinalysis Basic - ( 07 Sep 2019 16:07 )    Color: LIGHT YELLOW / Appearance: CLEAR / S.012 / pH: 6.0  Gluc: NEGATIVE / Ketone: NEGATIVE  / Bili: NEGATIVE / Urobili: NORMAL   Blood: MODERATE / Protein: 200 / Nitrite: NEGATIVE   Leuk Esterase: NEGATIVE / RBC: 11-25 / WBC 0-2   Sq Epi: OCC / Non Sq Epi: x / Bacteria: NEGATIVE      CAPILLARY BLOOD GLUCOSE      POCT Blood Glucose.: 80 mg/dL (08 Sep 2019 07:44)    LIVER FUNCTIONS - ( 08 Sep 2019 16:55 )  Alb: 3.0 g/dL / Pro: 6.1 g/dL / ALK PHOS: 122 u/L / ALT: 38 u/L / AST: 55 u/L / GGT: x             MEDICATIONS  (STANDING):  acetaminophen   Tablet .. 975 milliGRAM(s) Oral every 6 hours  acetaminophen  IVPB .. 1000 milliGRAM(s) IV Intermittent every 6 hours  chlorhexidine 4% Liquid 1 Application(s) Topical <User Schedule>  diphtheria/tetanus/pertussis (acellular) Vaccine (ADAcel) 0.5 milliLiter(s) IntraMuscular once  furosemide   Injectable 40 milliGRAM(s) IV Push two times a day  heparin  Injectable 5000 Unit(s) SubCutaneous every 12 hours  hydrALAZINE 50 milliGRAM(s) Oral every 8 hours  influenza   Vaccine 0.5 milliLiter(s) IntraMuscular once  niCARdipine 30 milliGRAM(s) Oral every 8 hours    MEDICATIONS  (PRN):  diphenhydrAMINE 25 milliGRAM(s) Oral every 6 hours PRN Itching  docusate sodium 100 milliGRAM(s) Oral two times a day PRN Stool softening  glycerin Suppository - Adult 1 Suppository(s) Rectal at bedtime PRN Constipation  lanolin Ointment 1 Application(s) Topical every 6 hours PRN Sore Nipples  magnesium hydroxide Suspension 30 milliLiter(s) Oral two times a day PRN Constipation  oxyCODONE    IR 5 milliGRAM(s) Oral every 3 hours PRN Moderate to Severe Pain (4-10)  oxyCODONE    IR 5 milliGRAM(s) Oral once PRN Moderate to Severe Pain (4-10)  simethicone 80 milliGRAM(s) Chew every 4 hours PRN Gas

## 2019-09-09 NOTE — PROGRESS NOTE ADULT - PROBLEM SELECTOR PLAN 1
- appreciate SICU care  - CV: hemodynamically stable, continue hydralazine, nicardipine and lasix  - Resp: saturating well   - GI: reg diet  - FEN: trend potassium, K 4.5 overnight  - Heme: HSQ for DVT ppx   - Neuro: Keppra for seizure prophylaxis   - : vuong in situ, strict I/O, consider d/c vuong in AM    Kole De León MD PGY3

## 2019-09-09 NOTE — PROGRESS NOTE ADULT - PROBLEM SELECTOR PLAN 1
- appreciate SICU care  - CV: hemodynamically stable, continue hydralazine, nicardipine and lasix  - Resp: saturating well   - GI: reg diet  - FEN: trend potassium   - Heme: HSQ for DVT ppx   - Neuro: Keppra for seizure prophylaxis   - : vuong in situ, strict I/O, consider d/c carolann in AM     AMER[place] Mcdonough pgy3

## 2019-09-09 NOTE — PROGRESS NOTE ADULT - PROBLEM SELECTOR PLAN 1
Pt. with hyperkalemia in the setting of increased dietary potassium intake and LR IVFs. Serum potassium elevated at 5.9 on 9/6/19 and further rise at 7.3 on 9/7/19. EKG with mild peaked T waves. Pt. with low haptoglobin (< 20 on 9/8/19). Pt. with appropriately elevated urine potassium. Pt. with medical management of hyperkalemia with insulin/D50, calcium gluconate, and Lasix. Serum potassium this AM WNL (4.5). Check serum potassium this AM.  Monitor serum potassium Pt. with hyperkalemia in the setting of increased dietary potassium intake, elevated serum magnesium, and LR IVFs. Serum potassium elevated at 5.9 on 9/6/19 and further rise at 7.3 on 9/7/19. EKG with mild peaked T waves. Pt. with low haptoglobin (< 20 on 9/8/19). Pt. with appropriately elevated urine potassium. Recommend ruling out hemolysis. Pt. with medical management of hyperkalemia with insulin/D50, calcium gluconate, and Lasix. Serum potassium this AM WNL (4.5). Check serum potassium this AM.  Monitor serum potassium

## 2019-09-09 NOTE — PROGRESS NOTE ADULT - SUBJECTIVE AND OBJECTIVE BOX
R3 Note  HD#10  PPD# 2    INTERVAL HPI/OVERNIGHT EVENTS: Pt seen and examined at bedside.  Pt without complains this morning.  Ambulating, passing flatus, tolerating regular diet, pain controlled with analgesia, urinating spontaneously  SHe denies nausea/vomiting/fever/chills/chest pain/SOB/dizziness.    MEDICATIONS  (STANDING):  acetaminophen   Tablet .. 975 milliGRAM(s) Oral every 6 hours  diphtheria/tetanus/pertussis (acellular) Vaccine (ADAcel) 0.5 milliLiter(s) IntraMuscular once  heparin  Injectable 5000 Unit(s) SubCutaneous every 12 hours  hydrALAZINE 50 milliGRAM(s) Oral every 8 hours  influenza   Vaccine 0.5 milliLiter(s) IntraMuscular once  niCARdipine 30 milliGRAM(s) Oral every 8 hours    MEDICATIONS  (PRN):  diphenhydrAMINE 25 milliGRAM(s) Oral every 6 hours PRN Itching  docusate sodium 100 milliGRAM(s) Oral two times a day PRN Stool softening  glycerin Suppository - Adult 1 Suppository(s) Rectal at bedtime PRN Constipation  lanolin Ointment 1 Application(s) Topical every 6 hours PRN Sore Nipples  magnesium hydroxide Suspension 30 milliLiter(s) Oral two times a day PRN Constipation  oxyCODONE    IR 5 milliGRAM(s) Oral every 3 hours PRN Moderate Pain (4 - 6)  oxyCODONE    IR 10 milliGRAM(s) Oral every 3 hours PRN Severe Pain (7 - 10)  simethicone 80 milliGRAM(s) Chew every 4 hours PRN Gas      12 point ROS negative except as outlined above    Vital Signs Last 24 Hrs  T(C): 36.7 (09 Sep 2019 08:00), Max: 37.2 (08 Sep 2019 12:00)  T(F): 98.1 (09 Sep 2019 08:00), Max: 99 (08 Sep 2019 12:00)  HR: 103 (09 Sep 2019 08:00) (70 - 103)  BP: 126/76 (09 Sep 2019 08:00) (123/72 - 157/97)  BP(mean): 86 (09 Sep 2019 08:00) (83 - 112)  RR: 13 (09 Sep 2019 08:00) (12 - 16)  SpO2: 97% (09 Sep 2019 08:00) (95% - 99%)    I&O's Summary    08 Sep 2019 07:01  -  09 Sep 2019 07:00  --------------------------------------------------------  IN: 800 mL / OUT: 4905 mL / NET: -4105 mL          PHYSICAL EXAM:    General: NAD  Pulmonary: Nonlabored breathing, no respiratory distress, CTA-B  Cardiovascular: NSR, no murmurs  Abdominal: soft, NT/ND, +BS, no Pfannenstiel incision, C/D/I, steri strips  : pad with minimal blood, vuong in situ with clear yellow urine in bag  Ext: no edema/tenderness in LE b/l  Lines:      LABS:                          9.8    10.28 )-----------( 255      ( 09 Sep 2019 00:09 )             32.1   baso x      eos x      imm gran x      lymph x      mono x      poly x                            10.4   10.92 )-----------( 262      ( 08 Sep 2019 16:55 )             33.1   baso x      eos x      imm gran x      lymph x      mono x      poly x                            8.4    9.88  )-----------( 221      ( 08 Sep 2019 04:35 )             26.3   baso x      eos x      imm gran x      lymph x      mono x      poly x                            10.7   6.90  )-----------( 242      ( 07 Sep 2019 21:10 )             34.3   baso 0.1    eos 0.1    imm gran 1.4    lymph 11.4   mono 2.2    poly 84.8                         10.0   6.48  )-----------( 181      ( 07 Sep 2019 15:56 )             33.3   baso x      eos x      imm gran x      lymph x      mono x      poly x                            9.7    6.94  )-----------( 233      ( 07 Sep 2019 09:00 )             30.4   baso 0.4    eos 0.3    imm gran 2.0    lymph 14.3   mono 6.3    poly 76.7                         10.1   7.11  )-----------( 240      ( 07 Sep 2019 03:02 )             32.9   baso 0.3    eos 0.6    imm gran 2.1    lymph 16.2   mono 8.0    poly 72.8                         8.7    5.53  )-----------( 219      ( 06 Sep 2019 19:00 )             27.7   baso 0.4    eos 0.4    imm gran 1.6    lymph 24.6   mono 11.2   poly 61.8         PT/INR - ( 08 Sep 2019 16:55 )   PT: 13.0 SEC;   INR: 1.14          PTT - ( 08 Sep 2019 16:55 )  PTT:26.5 SEC  Urinalysis Basic - ( 07 Sep 2019 16:07 )    Color: LIGHT YELLOW / Appearance: CLEAR / S.012 / pH: 6.0  Gluc: NEGATIVE / Ketone: NEGATIVE  / Bili: NEGATIVE / Urobili: NORMAL   Blood: MODERATE / Protein: 200 / Nitrite: NEGATIVE   Leuk Esterase: NEGATIVE / RBC: 11-25 / WBC 0-2   Sq Epi: OCC / Non Sq Epi: x / Bacteria: NEGATIVE

## 2019-09-09 NOTE — PROGRESS NOTE ADULT - ASSESSMENT
28 yo  POD#2 from pLTCS for worsening severe preeclampsia c/b hyperkalemia, hypothermia. Clinical condition improving with improvement of serum potassium.

## 2019-09-10 LAB
ALBUMIN SERPL ELPH-MCNC: 2.7 G/DL — LOW (ref 3.3–5)
ALBUMIN SERPL ELPH-MCNC: 2.7 G/DL — LOW (ref 3.3–5)
ALP SERPL-CCNC: 96 U/L — SIGNIFICANT CHANGE UP (ref 40–120)
ALP SERPL-CCNC: 97 U/L — SIGNIFICANT CHANGE UP (ref 40–120)
ALT FLD-CCNC: 25 U/L — SIGNIFICANT CHANGE UP (ref 4–33)
ALT FLD-CCNC: 26 U/L — SIGNIFICANT CHANGE UP (ref 4–33)
ANION GAP SERPL CALC-SCNC: 13 MMO/L — SIGNIFICANT CHANGE UP (ref 7–14)
ANION GAP SERPL CALC-SCNC: 9 MMO/L — SIGNIFICANT CHANGE UP (ref 7–14)
AST SERPL-CCNC: 30 U/L — SIGNIFICANT CHANGE UP (ref 4–32)
AST SERPL-CCNC: 32 U/L — SIGNIFICANT CHANGE UP (ref 4–32)
BILIRUB SERPL-MCNC: 0.3 MG/DL — SIGNIFICANT CHANGE UP (ref 0.2–1.2)
BILIRUB SERPL-MCNC: 0.3 MG/DL — SIGNIFICANT CHANGE UP (ref 0.2–1.2)
BUN SERPL-MCNC: 17 MG/DL — SIGNIFICANT CHANGE UP (ref 7–23)
BUN SERPL-MCNC: 21 MG/DL — SIGNIFICANT CHANGE UP (ref 7–23)
CA-I BLD-SCNC: 1.08 MMOL/L — SIGNIFICANT CHANGE UP (ref 1.03–1.23)
CALCIUM SERPL-MCNC: 8.2 MG/DL — LOW (ref 8.4–10.5)
CALCIUM SERPL-MCNC: 8.5 MG/DL — SIGNIFICANT CHANGE UP (ref 8.4–10.5)
CHLORIDE SERPL-SCNC: 102 MMOL/L — SIGNIFICANT CHANGE UP (ref 98–107)
CHLORIDE SERPL-SCNC: 104 MMOL/L — SIGNIFICANT CHANGE UP (ref 98–107)
CO2 SERPL-SCNC: 22 MMOL/L — SIGNIFICANT CHANGE UP (ref 22–31)
CO2 SERPL-SCNC: 23 MMOL/L — SIGNIFICANT CHANGE UP (ref 22–31)
CREAT SERPL-MCNC: 0.77 MG/DL — SIGNIFICANT CHANGE UP (ref 0.5–1.3)
CREAT SERPL-MCNC: 0.77 MG/DL — SIGNIFICANT CHANGE UP (ref 0.5–1.3)
GLUCOSE SERPL-MCNC: 110 MG/DL — HIGH (ref 70–99)
GLUCOSE SERPL-MCNC: 74 MG/DL — SIGNIFICANT CHANGE UP (ref 70–99)
HCT VFR BLD CALC: 27.8 % — LOW (ref 34.5–45)
HCT VFR BLD CALC: 28.6 % — LOW (ref 34.5–45)
HGB BLD-MCNC: 8.7 G/DL — LOW (ref 11.5–15.5)
HGB BLD-MCNC: 8.7 G/DL — LOW (ref 11.5–15.5)
MAGNESIUM SERPL-MCNC: 2.1 MG/DL — SIGNIFICANT CHANGE UP (ref 1.6–2.6)
MCHC RBC-ENTMCNC: 25.3 PG — LOW (ref 27–34)
MCHC RBC-ENTMCNC: 25.4 PG — LOW (ref 27–34)
MCHC RBC-ENTMCNC: 30.4 % — LOW (ref 32–36)
MCHC RBC-ENTMCNC: 31.3 % — LOW (ref 32–36)
MCV RBC AUTO: 80.8 FL — SIGNIFICANT CHANGE UP (ref 80–100)
MCV RBC AUTO: 83.4 FL — SIGNIFICANT CHANGE UP (ref 80–100)
NRBC # FLD: 0 K/UL — SIGNIFICANT CHANGE UP (ref 0–0)
NRBC # FLD: 0 K/UL — SIGNIFICANT CHANGE UP (ref 0–0)
PHOSPHATE SERPL-MCNC: 3 MG/DL — SIGNIFICANT CHANGE UP (ref 2.5–4.5)
PLATELET # BLD AUTO: 229 K/UL — SIGNIFICANT CHANGE UP (ref 150–400)
PLATELET # BLD AUTO: 236 K/UL — SIGNIFICANT CHANGE UP (ref 150–400)
PMV BLD: 10.8 FL — SIGNIFICANT CHANGE UP (ref 7–13)
PMV BLD: 11.3 FL — SIGNIFICANT CHANGE UP (ref 7–13)
POTASSIUM SERPL-MCNC: 4.3 MMOL/L — SIGNIFICANT CHANGE UP (ref 3.5–5.3)
POTASSIUM SERPL-MCNC: 4.4 MMOL/L — SIGNIFICANT CHANGE UP (ref 3.5–5.3)
POTASSIUM SERPL-SCNC: 4.3 MMOL/L — SIGNIFICANT CHANGE UP (ref 3.5–5.3)
POTASSIUM SERPL-SCNC: 4.4 MMOL/L — SIGNIFICANT CHANGE UP (ref 3.5–5.3)
PROT SERPL-MCNC: 5.3 G/DL — LOW (ref 6–8.3)
PROT SERPL-MCNC: 5.4 G/DL — LOW (ref 6–8.3)
RBC # BLD: 3.43 M/UL — LOW (ref 3.8–5.2)
RBC # BLD: 3.44 M/UL — LOW (ref 3.8–5.2)
RBC # FLD: 17.2 % — HIGH (ref 10.3–14.5)
RBC # FLD: 17.2 % — HIGH (ref 10.3–14.5)
SODIUM SERPL-SCNC: 136 MMOL/L — SIGNIFICANT CHANGE UP (ref 135–145)
SODIUM SERPL-SCNC: 137 MMOL/L — SIGNIFICANT CHANGE UP (ref 135–145)
WBC # BLD: 10.15 K/UL — SIGNIFICANT CHANGE UP (ref 3.8–10.5)
WBC # BLD: 9.63 K/UL — SIGNIFICANT CHANGE UP (ref 3.8–10.5)
WBC # FLD AUTO: 10.15 K/UL — SIGNIFICANT CHANGE UP (ref 3.8–10.5)
WBC # FLD AUTO: 9.63 K/UL — SIGNIFICANT CHANGE UP (ref 3.8–10.5)

## 2019-09-10 PROCEDURE — 99233 SBSQ HOSP IP/OBS HIGH 50: CPT | Mod: GC

## 2019-09-10 RX ORDER — TETANUS TOXOID, REDUCED DIPHTHERIA TOXOID AND ACELLULAR PERTUSSIS VACCINE, ADSORBED 5; 2.5; 8; 8; 2.5 [IU]/.5ML; [IU]/.5ML; UG/.5ML; UG/.5ML; UG/.5ML
0.5 SUSPENSION INTRAMUSCULAR ONCE
Refills: 0 | Status: COMPLETED | OUTPATIENT
Start: 2019-09-10 | End: 2019-09-10

## 2019-09-10 RX ADMIN — NICARDIPINE HYDROCHLORIDE 30 MILLIGRAM(S): 30 CAPSULE, EXTENDED RELEASE ORAL at 15:43

## 2019-09-10 RX ADMIN — Medication 975 MILLIGRAM(S): at 23:20

## 2019-09-10 RX ADMIN — TETANUS TOXOID, REDUCED DIPHTHERIA TOXOID AND ACELLULAR PERTUSSIS VACCINE, ADSORBED 0.5 MILLILITER(S): 5; 2.5; 8; 8; 2.5 SUSPENSION INTRAMUSCULAR at 22:38

## 2019-09-10 RX ADMIN — Medication 975 MILLIGRAM(S): at 01:00

## 2019-09-10 RX ADMIN — HEPARIN SODIUM 5000 UNIT(S): 5000 INJECTION INTRAVENOUS; SUBCUTANEOUS at 18:00

## 2019-09-10 RX ADMIN — NICARDIPINE HYDROCHLORIDE 30 MILLIGRAM(S): 30 CAPSULE, EXTENDED RELEASE ORAL at 06:22

## 2019-09-10 RX ADMIN — Medication 50 MILLIGRAM(S): at 04:27

## 2019-09-10 RX ADMIN — HEPARIN SODIUM 5000 UNIT(S): 5000 INJECTION INTRAVENOUS; SUBCUTANEOUS at 06:31

## 2019-09-10 RX ADMIN — Medication 975 MILLIGRAM(S): at 06:31

## 2019-09-10 RX ADMIN — Medication 50 MILLIGRAM(S): at 13:28

## 2019-09-10 RX ADMIN — Medication 975 MILLIGRAM(S): at 22:31

## 2019-09-10 RX ADMIN — Medication 975 MILLIGRAM(S): at 07:30

## 2019-09-10 RX ADMIN — Medication 50 MILLIGRAM(S): at 22:31

## 2019-09-10 RX ADMIN — INFLUENZA VIRUS VACCINE 0.5 MILLILITER(S): 15; 15; 15; 15 SUSPENSION INTRAMUSCULAR at 18:41

## 2019-09-10 RX ADMIN — Medication 975 MILLIGRAM(S): at 00:03

## 2019-09-10 NOTE — DIETITIAN INITIAL EVALUATION ADULT. - PERTINENT LABORATORY DATA
09-10 Na n/a   Glu n/a   K+ n/a   Cr n/a   BUN n/a   Phos n/a   Alb n/a   PAB n/a   Hgb 8.7 g/dL<L> Hct 28.6 %<L> HgA1C n/a    Glucose, Serum: 74 mg/dL  Glucose, Serum: 110 mg/dL <H>

## 2019-09-10 NOTE — PROGRESS NOTE ADULT - SUBJECTIVE AND OBJECTIVE BOX
Postpartum Note,  Section  She is a  29y woman who is now post-operative day: 3    Subjective:  The patient feels well.  She is ambulating.   She is tolerating regular diet.  She denies nausea and vomiting.  She is voiding.  Her pain is controlled.  She reports normal postpartum bleeding.  She is formula feeding.    Physical exam:    Vital Signs Last 24 Hrs  T(C): 36.8 (10 Sep 2019 06:19), Max: 37.1 (10 Sep 2019 01:14)  T(F): 98.2 (10 Sep 2019 06:19), Max: 98.8 (10 Sep 2019 01:14)  HR: 90 (10 Sep 2019 06:19) (90 - 105)  BP: 133/78 (10 Sep 2019 06:19) (133/78 - 155/85)  BP(mean): --  RR: 16 (10 Sep 2019 06:19) (16 - 16)  SpO2: 100% (10 Sep 2019 06:19) (97% - 100%)    Gen: NAD  Breast: Soft, nontender, not engorged.  Abdomen: Soft, nontender, no distension , firm uterine fundus at umbilicus.  Incision: Clean, dry, and intact with steri strips  Pelvic: Normal lochia noted  Ext: No calf tenderness    LABS:                        8.7    9.63  )-----------( 236      ( 10 Sep 2019 06:48 )             28.6       Rubella status:     Allergies    No Known Drug Allergies  shellfish (Hives)    Intolerances      MEDICATIONS  (STANDING):  acetaminophen   Tablet .. 975 milliGRAM(s) Oral every 6 hours  diphtheria/tetanus/pertussis (acellular) Vaccine (ADAcel) 0.5 milliLiter(s) IntraMuscular once  heparin  Injectable 5000 Unit(s) SubCutaneous every 12 hours  hydrALAZINE 50 milliGRAM(s) Oral every 8 hours  influenza   Vaccine 0.5 milliLiter(s) IntraMuscular once  niCARdipine 30 milliGRAM(s) Oral every 8 hours    MEDICATIONS  (PRN):  diphenhydrAMINE 25 milliGRAM(s) Oral every 6 hours PRN Itching  docusate sodium 100 milliGRAM(s) Oral two times a day PRN Stool softening  glycerin Suppository - Adult 1 Suppository(s) Rectal at bedtime PRN Constipation  lanolin Ointment 1 Application(s) Topical every 6 hours PRN Sore Nipples  magnesium hydroxide Suspension 30 milliLiter(s) Oral two times a day PRN Constipation  oxyCODONE    IR 5 milliGRAM(s) Oral every 3 hours PRN Moderate Pain (4 - 6)  oxyCODONE    IR 10 milliGRAM(s) Oral every 3 hours PRN Severe Pain (7 - 10)  simethicone 80 milliGRAM(s) Chew every 4 hours PRN Gas        Assessment and Plan  POD #3  s/p  section for severe preeclampsia, hyperkalemia  Doing well-K now normal range,   BP improved with current meds  Encourage ambulation.  Continue routine post op care.

## 2019-09-10 NOTE — DIETITIAN INITIAL EVALUATION ADULT. - PERTINENT MEDS FT
acetaminophen   Tablet ..  diphenhydrAMINE PRN  diphtheria/tetanus/pertussis (acellular) Vaccine (ADAcel)  docusate sodium PRN  glycerin Suppository - Adult PRN  heparin  Injectable  hydrALAZINE  influenza   Vaccine  lanolin Ointment PRN  magnesium hydroxide Suspension PRN  niCARdipine  oxyCODONE    IR PRN  oxyCODONE    IR PRN  simethicone PRN

## 2019-09-10 NOTE — DIETITIAN INITIAL EVALUATION ADULT. - PHYSICAL APPEARANCE
well nourished/other (specify) No pressure ulcers/DTI noted in flowsheets.   Edema: 1+ left and right leg

## 2019-09-10 NOTE — PROGRESS NOTE ADULT - PROBLEM SELECTOR PLAN 1
- CV: hemodynamically stable, continue hydralazine, and nicardipine  - Resp: saturating well   - GI: reg diet  - FEN: trend potassium, K 4.3 this morning  - Heme: HSQ for DVT ppx   - Neuro: Keppra for seizure prophylaxis   - : voiding spontaneously    Kole De León MD PGY3

## 2019-09-10 NOTE — PROGRESS NOTE ADULT - ATTENDING COMMENTS
29 year old femlae  at 30.5 weeks GA who was sent from OB's private office for evaluation of elevated blood pressure in the office (158/88) and reports on sono today baby measuring SGA as per patient report.  Patient now S/P  on 19 with concern for post-operative sepsis in setting of hypothermia, hyperkalemia in setting of FARIHA vs. hemolysis vs pseudohyperkalemia      PLAN:    NEUROLOGY:  - Continue with Acetaminophen and Oxycodone PRN for pain control  - Continue with IV Keppra for seizure prophylaxis in setting of pre-eclampsia, no Mg given that hypermagnesemia may be contributing to hyperkalemia      RESPIRATORY:  - No active issues  - Saturating well on room air  - Maintain O2 saturation >92%    CARDIOVASCULAR:  - Pre-eclampsia, goal SBP < 160s  - D/c Labetalol and Nifedipine as may inhibit RAAS- starting Hydralazine 50 Q8H and Nicardipine 30 mg Q8H  - Keep MAP >65    GI / NUTRITION:  - Regular diet    RENAL / GENITOURINARY:  - Hyperkalemia now resolving- FARIHA vs hemolysis vs. pseudohyperkalemia   - C/w Lasix 40 mg IV BID, c/w vuong  - Strict Is/Os    HEMATOLOGIC:  - Continue to monitor CBC daily (concern for HELLP syndome)  - Platelets currently 181K    INFECTIOUS DISEASE:  - Hypothermic with no leukocytosis  - Lactate normal at 1.5  - low suspicion of sepsis d/c IV antibiotics    ENDOCRINOLOGY:  - No active issues  - Continue to monitor glucose on BMP (goal 140-180)      Disposition: SICU  The patient is a critical care patient with life threatening hemodynamic and metabolic instability in SICU.  I have personally interviewed when possible and examined the patient, reviewed data and laboratory tests/x-rays and all pertinent electronic images.  I was physically present for the key portions of the evaluation and management (E/M) service provided.   The SICU team has a constant risk benefit analyzes discussion with the primary team, all consultants, House Staff and PA's on all decisions.  These diagnoses are unrelated to the surgical procedure noted above.  I meet with family if needed to get further history, discuss the case and make care decisions for this patient who might not be able to participate.  Time involved in performance of separately billable procedures was not counted toward my critical care time. There is no overlap.  I spent 55-75 minutes ( 0800Hrs-0915Hrs in AM/ 1600Hrs-1715Hrs in PM, or other time indicated) of critical care time for the diagnoses, assessment, plan and interventions.  This time excludes time spent on separate procedures and teaching.
I have personally interviewed and examined this patient, reviewed pertinent labs and imaging, and discussed the case with colleagues, residents, physician assistants, and nurses on SICU rounds.      15   minutes in total were spent in providing direct critical care for the diagnoses, assessment and plan outlined below.  These diagnoses are unrelated to the surgical procedure.  Additionally, time spent in the performance of separately billable procedures was not counted toward the critical care time.  There is no overlap.    plan  neuro: no active issue  cards: no active issue  pulm: room air  gi: tolerating diet  gu: voiding, hyperkalemia resolved  heme: no active issue  endo: no active issue  proph: scds
I saw and examined Ms. Marcelo and agree with edited Fellow assessment and plan as above. Discussed plan of care with OB Safety Officers and Huddle to be held with anesthesia and nursing teams. PGY3 left message to inform her Primary OB.     Preeclampsia with severe features and fetal growth restriction with worsening renal function (known complication of preeclampsia) with worsening electrolyte abnormalities (severe hyperkalemia, hypocalcemia and mild hyponatremia). Blood pressures mild range and labs are not suggestive of HELLP syndrome.     Recommend:  Discontinue magnesium sulfate for seizure prophylaxis, Keppra 500mg IV q12h x 2 doses can be used for seizure prophylaxis. Fetus has been exposed to magnesium for neuroprotection.   Insulin with supplemental D5 and 2g IV calcium gluconate.   Continuous cardiac monitoring.   Delivery given worsening renal function and maternal status. NICU consultation is appreciated.   Continue current antihypertensives.   MICU called - consultation is appreciated.   Serial trending of BMP and coagulation profile. IV normal saline.    Call with questions.   Teresa Rose MD
resolved hyperkalemia
resolved hyperkalemia and renal failure  can give Lasix PRN for edema ( can decrease milk volume and this was discussed with pt)  needs f/u with nephrology when discharged for f/u for pre-eclampsia( proteinuria)  will sign off

## 2019-09-10 NOTE — PROGRESS NOTE ADULT - PROBLEM SELECTOR PLAN 1
Pt. with hyperkalemia in the setting of increased dietary potassium intake, elevated serum magnesium, and LR IVFs. Serum potassium elevated at 5.9 on 9/6/19 and further rise at 7.3 on 9/7/19. EKG with mild peaked T waves. Pt. with low haptoglobin (< 20 on 9/8/19). Pt. with appropriately elevated urine potassium. Recommend ruling out hemolysis. Pt. with medical management of hyperkalemia with insulin/D50, calcium gluconate, and Lasix. Serum potassium this AM WNL (4.3).  Monitor serum potassium

## 2019-09-10 NOTE — DIETITIAN INITIAL EVALUATION ADULT. - RD TO REMAIN AVAILABLE
1. Continue Regular diet. 2. Please Encourage po intake, assist with meals and menu selections, provide alternatives PRN. 3. Recommend prenatal multivitamin daily.

## 2019-09-10 NOTE — DIETITIAN INITIAL EVALUATION ADULT. - OTHER INFO
Per chart review patient 29 year old female presenting at 30 weeks 5 days gestation admitted for maternal HTN during pregnancy. Patient s/p  section . Patient reports good appetite/PO intake. Tolerating meals. No GI distress (nausea/vomiting/diarrhea/constipation) noted at this time. No nutrition related concerns voiced at this time.

## 2019-09-10 NOTE — PROGRESS NOTE ADULT - SUBJECTIVE AND OBJECTIVE BOX
[facility] DIVISION OF KIDNEY DISEASES AND HYPERTENSION -- FOLLOW UP NOTE  --------------------------------------------------------------------------------  HPI: 28 yo F with no previous medical history admitted with pre-eclampsia. Nephrology team is following for hyperkalemia. Pt. admitted with pre-eclampsia on 8/31/19 treated with labetalol and nifedipine. Pt. states has been eating fruits (bananas, peaches) during stay. Of note, pt. on LR as IVF. Pt. with no previous history of kidney disease or hyperkalemia. On lab review of [facility] Scr WNL (0.87) on 8/31/19 and slowly increase at 0.94 on 9/4/19 and 1.01 on 9/7/19. Serum potassium elevated at 5.9 on 9/6/19 and further rise at 7.3 on 9/7/19. 24-hr protein showed 3.3 g and UA with +RBC. Pt. underwent medical management for hyperkalemia. Serum potassium earlier this AM is normal at 4.3.     Pt. currently feels well, offers no complains. Denies CP, abdominal pain, nausea, vomiting, diarrhea, headache, dizziness.    PAST HISTORY  --------------------------------------------------------------------------------  No significant changes to PMH, PSH, FHx, SHx, unless otherwise noted    ALLERGIES & MEDICATIONS  --------------------------------------------------------------------------------  Allergies    No Known Drug Allergies  shellfish (Hives)    Intolerances    Standing Inpatient Medications  acetaminophen   Tablet .. 975 milliGRAM(s) Oral every 6 hours  diphtheria/tetanus/pertussis (acellular) Vaccine (ADAcel) 0.5 milliLiter(s) IntraMuscular once  heparin  Injectable 5000 Unit(s) SubCutaneous every 12 hours  hydrALAZINE 50 milliGRAM(s) Oral every 8 hours  influenza   Vaccine 0.5 milliLiter(s) IntraMuscular once  niCARdipine 30 milliGRAM(s) Oral every 8 hours    REVIEW OF SYSTEMS  --------------------------------------------------------------------------------  Gen: No fevers/chills  Skin: No rashes  Head/Eyes/Ears: Normal hearing,   Respiratory: No dyspnea, cough  CV: No chest pain  MSK: No  edema    All other systems were reviewed and are negative, except as noted.    VITALS/PHYSICAL EXAM  --------------------------------------------------------------------------------  T(C): 36.8 (09-10-19 @ 06:19), Max: 37.1 (09-10-19 @ 01:14)  HR: 90 (09-10-19 @ 06:19) (90 - 105)  BP: 133/78 (09-10-19 @ 06:19) (126/76 - 155/85)  RR: 16 (09-10-19 @ 06:19) (13 - 16)  SpO2: 100% (09-10-19 @ 06:19) (97% - 100%)  Wt(kg): --    09-09-19 @ 07:01  -  09-10-19 @ 07:00  --------------------------------------------------------  IN: 0 mL / OUT: 800 mL / NET: -800 mL    Physical Exam:  	Gen: NAD  	HEENT: MMM  	Pulm: CTA B/L  	CV: S1S2  	Abd: Soft, +BS  	Ext: Trace LE edema B/L  	Neuro: Awake  	Skin: Warm and dry    LABS/STUDIES  --------------------------------------------------------------------------------              8.7    10.15 >-----------<  229      [09-10-19 @ 01:11]              27.8     137  |  102  |  21  ----------------------------<  110      [09-10-19 @ 01:11]  4.3   |  22  |  0.77        Ca     8.2     [09-10-19 @ 01:11]      iCa    1.08     [09-10 @ 01:11]      Mg     2.1     [09-10-19 @ 01:11]      Phos  3.0     [09-10-19 @ 01:11]    Creatinine Trend:  SCr 0.77 [09-10 @ 01:11]  SCr 1.02 [09-09 @ 00:09]  SCr 1.21 [09-08 @ 16:55]  SCr 1.24 [09-08 @ 04:35]  SCr 1.12 [09-07 @ 19:30] [facility] DIVISION OF KIDNEY DISEASES AND HYPERTENSION -- FOLLOW UP NOTE  --------------------------------------------------------------------------------  HPI: 30 yo F with no previous medical history admitted with pre-eclampsia. Nephrology team is following for hyperkalemia. Pt. admitted with pre-eclampsia on 8/31/19 treated with labetalol and nifedipine. Pt. states has been eating fruits (bananas, peaches) during stay. Of note, pt. on LR as IVF. Pt. with no previous history of kidney disease or hyperkalemia. On lab review of [facility] Scr WNL (0.87) on 8/31/19 and slowly increase at 0.94 on 9/4/19 and 1.01 on 9/7/19. Serum potassium elevated at 5.9 on 9/6/19 and further rise at 7.3 on 9/7/19. 24-hr protein showed 3.3 g and UA with +RBC. Pt. underwent medical management for hyperkalemia. Serum potassium earlier this AM is normal at 4.3.     Pt. currently feels well, offers no complains. Denies CP, abdominal pain, nausea, vomiting, diarrhea, headache, dizziness.    PAST HISTORY  --------------------------------------------------------------------------------  No significant changes to PMH, PSH, FHx, SHx, unless otherwise noted    ALLERGIES & MEDICATIONS  --------------------------------------------------------------------------------  Allergies    No Known Drug Allergies  shellfish (Hives)    Intolerances    Standing Inpatient Medications  acetaminophen   Tablet .. 975 milliGRAM(s) Oral every 6 hours  diphtheria/tetanus/pertussis (acellular) Vaccine (ADAcel) 0.5 milliLiter(s) IntraMuscular once  heparin  Injectable 5000 Unit(s) SubCutaneous every 12 hours  hydrALAZINE 50 milliGRAM(s) Oral every 8 hours  influenza   Vaccine 0.5 milliLiter(s) IntraMuscular once  niCARdipine 30 milliGRAM(s) Oral every 8 hours    REVIEW OF SYSTEMS  --------------------------------------------------------------------------------  Gen: No fevers/chills  Skin: No rashes  Head/Eyes/Ears: Normal hearing,   Respiratory: No dyspnea, cough  CV: No chest pain  MSK: No  edema    All other systems were reviewed and are negative, except as noted.    VITALS/PHYSICAL EXAM  --------------------------------------------------------------------------------  T(C): 36.8 (09-10-19 @ 06:19), Max: 37.1 (09-10-19 @ 01:14)  HR: 90 (09-10-19 @ 06:19) (90 - 105)  BP: 133/78 (09-10-19 @ 06:19) (126/76 - 155/85)  RR: 16 (09-10-19 @ 06:19) (13 - 16)  SpO2: 100% (09-10-19 @ 06:19) (97% - 100%)  Wt(kg): --    09-09-19 @ 07:01  -  09-10-19 @ 07:00  --------------------------------------------------------  IN: 0 mL / OUT: 800 mL / NET: -800 mL    Physical Exam:  	Gen: NAD  	HEENT: MMM  	Pulm: CTA B/L  	CV: S1S2  	Abd: Soft, +BS  	Ext:  LE edema B/L  	Neuro: Awake  	Skin: Warm and dry    LABS/STUDIES  --------------------------------------------------------------------------------              8.7    10.15 >-----------<  229      [09-10-19 @ 01:11]              27.8     137  |  102  |  21  ----------------------------<  110      [09-10-19 @ 01:11]  4.3   |  22  |  0.77        Ca     8.2     [09-10-19 @ 01:11]      iCa    1.08     [09-10 @ 01:11]      Mg     2.1     [09-10-19 @ 01:11]      Phos  3.0     [09-10-19 @ 01:11]    Creatinine Trend:  SCr 0.77 [09-10 @ 01:11]  SCr 1.02 [09-09 @ 00:09]  SCr 1.21 [09-08 @ 16:55]  SCr 1.24 [09-08 @ 04:35]  SCr 1.12 [09-07 @ 19:30]

## 2019-09-10 NOTE — PROGRESS NOTE ADULT - SUBJECTIVE AND OBJECTIVE BOX
R3 Note  HD#11  PPD#11    INTERVAL HPI/OVERNIGHT EVENTS: Pt seen and examined at bedside.  Pt without complaints this morning.  Ambulating, passing flatus, tolerating regular diet, pain controlled with analgesia, urinating spontaneously  She denies nausea/vomiting/fever/chills/chest pain/SOB/dizziness.    MEDICATIONS  (STANDING):  acetaminophen   Tablet .. 975 milliGRAM(s) Oral every 6 hours  diphtheria/tetanus/pertussis (acellular) Vaccine (ADAcel) 0.5 milliLiter(s) IntraMuscular once  heparin  Injectable 5000 Unit(s) SubCutaneous every 12 hours  hydrALAZINE 50 milliGRAM(s) Oral every 8 hours  influenza   Vaccine 0.5 milliLiter(s) IntraMuscular once  niCARdipine 30 milliGRAM(s) Oral every 8 hours    MEDICATIONS  (PRN):  diphenhydrAMINE 25 milliGRAM(s) Oral every 6 hours PRN Itching  docusate sodium 100 milliGRAM(s) Oral two times a day PRN Stool softening  glycerin Suppository - Adult 1 Suppository(s) Rectal at bedtime PRN Constipation  lanolin Ointment 1 Application(s) Topical every 6 hours PRN Sore Nipples  magnesium hydroxide Suspension 30 milliLiter(s) Oral two times a day PRN Constipation  oxyCODONE    IR 5 milliGRAM(s) Oral every 3 hours PRN Moderate Pain (4 - 6)  oxyCODONE    IR 10 milliGRAM(s) Oral every 3 hours PRN Severe Pain (7 - 10)  simethicone 80 milliGRAM(s) Chew every 4 hours PRN Gas      12 point ROS negative except as outlined above    Vital Signs Last 24 Hrs  T(C): 36.8 (10 Sep 2019 06:19), Max: 37.1 (10 Sep 2019 01:14)  T(F): 98.2 (10 Sep 2019 06:19), Max: 98.8 (10 Sep 2019 01:14)  HR: 90 (10 Sep 2019 06:19) (90 - 105)  BP: 133/78 (10 Sep 2019 06:19) (133/78 - 155/85)  BP(mean): --  RR: 16 (10 Sep 2019 06:19) (16 - 16)  SpO2: 100% (10 Sep 2019 06:19) (97% - 100%)    I&O's Summary    09 Sep 2019 07:01  -  10 Sep 2019 07:00  --------------------------------------------------------  IN: 0 mL / OUT: 800 mL / NET: -800 mL          PHYSICAL EXAM:    General: NAD  Pulmonary: Nonlabored breathing, no respiratory distress, CTA-B  Cardiovascular: NSR, no murmurs  Abdominal: soft, NT/ND, +BS, no Pfannenstiel incision, C/D/I, steri strips  : pad with minimal blood, vuong in situ with clear yellow urine in bag  Ext: no edema/tenderness in LE b/l  Lines:      LABS:                          8.7    9.63  )-----------( 236      ( 10 Sep 2019 06:48 )             28.6   baso x      eos x      imm gran x      lymph x      mono x      poly x                            8.7    10.15 )-----------( 229      ( 10 Sep 2019 01:11 )             27.8   baso x      eos x      imm gran x      lymph x      mono x      poly x                            9.8    10.28 )-----------( 255      ( 09 Sep 2019 00:09 )             32.1   baso x      eos x      imm gran x      lymph x      mono x      poly x                            10.4   10.92 )-----------( 262      ( 08 Sep 2019 16:55 )             33.1   baso x      eos x      imm gran x      lymph x      mono x      poly x                            8.4    9.88  )-----------( 221      ( 08 Sep 2019 04:35 )             26.3   baso x      eos x      imm gran x      lymph x      mono x      poly x                            10.7   6.90  )-----------( 242      ( 07 Sep 2019 21:10 )             34.3   baso 0.1    eos 0.1    imm gran 1.4    lymph 11.4   mono 2.2    poly 84.8                         10.0   6.48  )-----------( 181      ( 07 Sep 2019 15:56 )             33.3   baso x      eos x      imm gran x      lymph x      mono x      poly x                            9.7    6.94  )-----------( 233      ( 07 Sep 2019 09:00 )             30.4   baso 0.4    eos 0.3    imm gran 2.0    lymph 14.3   mono 6.3    poly 76.7         PT/INR - ( 08 Sep 2019 16:55 )   PT: 13.0 SEC;   INR: 1.14          PTT - ( 08 Sep 2019 16:55 )  PTT:26.5 SEC R3 Note  HD#11  PPD#11    INTERVAL HPI/OVERNIGHT EVENTS: Pt seen and examined at bedside.  Pt without complaints this morning.  Ambulating, passing flatus, tolerating regular diet, pain controlled with analgesia, urinating spontaneously  She denies nausea/vomiting/fever/chills/chest pain/SOB/dizziness.    MEDICATIONS  (STANDING):  acetaminophen   Tablet .. 975 milliGRAM(s) Oral every 6 hours  diphtheria/tetanus/pertussis (acellular) Vaccine (ADAcel) 0.5 milliLiter(s) IntraMuscular once  heparin  Injectable 5000 Unit(s) SubCutaneous every 12 hours  hydrALAZINE 50 milliGRAM(s) Oral every 8 hours  influenza   Vaccine 0.5 milliLiter(s) IntraMuscular once  niCARdipine 30 milliGRAM(s) Oral every 8 hours    MEDICATIONS  (PRN):  diphenhydrAMINE 25 milliGRAM(s) Oral every 6 hours PRN Itching  docusate sodium 100 milliGRAM(s) Oral two times a day PRN Stool softening  glycerin Suppository - Adult 1 Suppository(s) Rectal at bedtime PRN Constipation  lanolin Ointment 1 Application(s) Topical every 6 hours PRN Sore Nipples  magnesium hydroxide Suspension 30 milliLiter(s) Oral two times a day PRN Constipation  oxyCODONE    IR 5 milliGRAM(s) Oral every 3 hours PRN Moderate Pain (4 - 6)  oxyCODONE    IR 10 milliGRAM(s) Oral every 3 hours PRN Severe Pain (7 - 10)  simethicone 80 milliGRAM(s) Chew every 4 hours PRN Gas      12 point ROS negative except as outlined above    Vital Signs Last 24 Hrs  T(C): 36.8 (10 Sep 2019 06:19), Max: 37.1 (10 Sep 2019 01:14)  T(F): 98.2 (10 Sep 2019 06:19), Max: 98.8 (10 Sep 2019 01:14)  HR: 90 (10 Sep 2019 06:19) (90 - 105)  BP: 133/78 (10 Sep 2019 06:19) (133/78 - 155/85)  BP(mean): --  RR: 16 (10 Sep 2019 06:19) (16 - 16)  SpO2: 100% (10 Sep 2019 06:19) (97% - 100%)    I&O's Summary    09 Sep 2019 07:01  -  10 Sep 2019 07:00  --------------------------------------------------------  IN: 0 mL / OUT: 800 mL / NET: -800 mL          PHYSICAL EXAM:    General: NAD  Pulmonary: Nonlabored breathing, no respiratory distress, CTA-B  Cardiovascular: NSR, no murmurs  Abdominal: soft, NT/ND, +BS, no Pfannenstiel incision, C/D/I, steri strips  : pad with minimal blood  Ext: no edema/tenderness in LE b/l  Lines:      LABS:                          8.7    9.63  )-----------( 236      ( 10 Sep 2019 06:48 )             28.6   baso x      eos x      imm gran x      lymph x      mono x      poly x                            8.7    10.15 )-----------( 229      ( 10 Sep 2019 01:11 )             27.8   baso x      eos x      imm gran x      lymph x      mono x      poly x                            9.8    10.28 )-----------( 255      ( 09 Sep 2019 00:09 )             32.1   baso x      eos x      imm gran x      lymph x      mono x      poly x                            10.4   10.92 )-----------( 262      ( 08 Sep 2019 16:55 )             33.1   baso x      eos x      imm gran x      lymph x      mono x      poly x                            8.4    9.88  )-----------( 221      ( 08 Sep 2019 04:35 )             26.3   baso x      eos x      imm gran x      lymph x      mono x      poly x                            10.7   6.90  )-----------( 242      ( 07 Sep 2019 21:10 )             34.3   baso 0.1    eos 0.1    imm gran 1.4    lymph 11.4   mono 2.2    poly 84.8                         10.0   6.48  )-----------( 181      ( 07 Sep 2019 15:56 )             33.3   baso x      eos x      imm gran x      lymph x      mono x      poly x                            9.7    6.94  )-----------( 233      ( 07 Sep 2019 09:00 )             30.4   baso 0.4    eos 0.3    imm gran 2.0    lymph 14.3   mono 6.3    poly 76.7         PT/INR - ( 08 Sep 2019 16:55 )   PT: 13.0 SEC;   INR: 1.14          PTT - ( 08 Sep 2019 16:55 )  PTT:26.5 SEC

## 2019-09-10 NOTE — PROGRESS NOTE ADULT - ASSESSMENT
28 yo  POD#3 from pLTCS for worsening severe preeclampsia c/b hyperkalemia, hypothermia. Clinical condition improving with improvement of serum potassium.

## 2019-09-11 ENCOUNTER — TRANSCRIPTION ENCOUNTER (OUTPATIENT)
Age: 29
End: 2019-09-11

## 2019-09-11 RX ORDER — NIFEDIPINE 30 MG
10 TABLET, EXTENDED RELEASE 24 HR ORAL ONCE
Refills: 0 | Status: COMPLETED | OUTPATIENT
Start: 2019-09-11 | End: 2019-09-11

## 2019-09-11 RX ORDER — NIFEDIPINE 30 MG
30 TABLET, EXTENDED RELEASE 24 HR ORAL DAILY
Refills: 0 | Status: DISCONTINUED | OUTPATIENT
Start: 2019-09-11 | End: 2019-09-12

## 2019-09-11 RX ADMIN — Medication 975 MILLIGRAM(S): at 14:15

## 2019-09-11 RX ADMIN — NICARDIPINE HYDROCHLORIDE 30 MILLIGRAM(S): 30 CAPSULE, EXTENDED RELEASE ORAL at 17:33

## 2019-09-11 RX ADMIN — NICARDIPINE HYDROCHLORIDE 30 MILLIGRAM(S): 30 CAPSULE, EXTENDED RELEASE ORAL at 09:05

## 2019-09-11 RX ADMIN — Medication 50 MILLIGRAM(S): at 13:19

## 2019-09-11 RX ADMIN — Medication 50 MILLIGRAM(S): at 20:55

## 2019-09-11 RX ADMIN — Medication 975 MILLIGRAM(S): at 05:36

## 2019-09-11 RX ADMIN — Medication 975 MILLIGRAM(S): at 13:19

## 2019-09-11 RX ADMIN — Medication 50 MILLIGRAM(S): at 05:36

## 2019-09-11 RX ADMIN — Medication 10 MILLIGRAM(S): at 01:37

## 2019-09-11 RX ADMIN — HEPARIN SODIUM 5000 UNIT(S): 5000 INJECTION INTRAVENOUS; SUBCUTANEOUS at 05:36

## 2019-09-11 RX ADMIN — NICARDIPINE HYDROCHLORIDE 30 MILLIGRAM(S): 30 CAPSULE, EXTENDED RELEASE ORAL at 00:59

## 2019-09-11 RX ADMIN — Medication 975 MILLIGRAM(S): at 06:30

## 2019-09-11 RX ADMIN — Medication 975 MILLIGRAM(S): at 18:26

## 2019-09-11 RX ADMIN — HEPARIN SODIUM 5000 UNIT(S): 5000 INJECTION INTRAVENOUS; SUBCUTANEOUS at 18:26

## 2019-09-11 NOTE — DISCHARGE NOTE OB - PATIENT PORTAL LINK FT
You can access the FollowMyHealth Patient Portal offered by [facility] by registering at the following website: [email or web address]. By joining [employer]’s FollowMyHealth portal, you will also be able to view your health information using other applications (apps) compatible with our system.

## 2019-09-11 NOTE — CHART NOTE - NSCHARTNOTEFT_GEN_A_CORE
Subjective  Patient seen and examined at bedside. Called by nursing to evaluate recurrent severe range BPs. Patient has no acute complaints at this time. She reports that she had just returned from ambulating to the NICU. Denies HA, changes in vision, CP, SOB, palpitations. She reports bilateral LE edema that has been unchanged since admission and denies LE/calf tenderness. Abdominal pain is well controlled with pain medication. She reports ambulation without difficulty, tolerating PO, passing flatus and voiding spontaneously     Objective  Vital Signs Last 24 Hrs  T(C): 36.6 (11 Sep 2019 00:50), Max: 37 (10 Sep 2019 10:00)  T(F): 97.9 (11 Sep 2019 00:50), Max: 98.6 (10 Sep 2019 10:00)  HR: 101 (11 Sep 2019 01:34) (84 - 101)  BP: 158/68 (11 Sep 2019 01:34) (133/78 - 166/82)  BP(mean): --  RR: 16 (11 Sep 2019 00:50) (16 - 18)  SpO2: 100% (11 Sep 2019 00:50) (99% - 100%)    Gen: NAD  CV: RRR, no murmurs, S1, S2  Resp: CTA-B, symmetrical expansion  Abd: soft, non-tender, fundus firm  Ext: symmetrical LE edema, no calf tenderness and neg Homen's sign bilaterally               8.7    9.63  )-----------( 236      ( 09-10 @ 06:48 )             28.6     09-10- @ 06:10    136  |  104  |  17             --------------------------< 74     4.4  |  23  | 0.77    eGFR AA: 120  eGFR N-AA: 104    Calcium: 8.5  Phosphorus: --  Magnesium: --    AST: 30    ALT: 26  AlkPhos: 97  Protein: 5.3<L>  Albumin: 2.7<L>  TBili: 0.3  D-Bili: --    A/P 30 yo  PPD#4 from pLT for worsening severe preeclampsia and hyperkalemia at 33w6d. Clinical condition improved with improved BP control. Nicardipine given 1 hour late from charting  - will give nifedipine 10mg IR now  - will continue to monitor BPs  - consider restarting magnesium for neurologic changes     d/w Dr. Naresh Mcdonough pgy3

## 2019-09-11 NOTE — PROGRESS NOTE ADULT - PROBLEM SELECTOR PROBLEM 1
Hyperkalemia
Preeclampsia, third trimester
Preeclampsia, third trimester
Hyperkalemia
Postoperative state
Preeclampsia, unspecified trimester
Postoperative state
Preeclampsia, severe, third trimester

## 2019-09-11 NOTE — PROVIDER CONTACT NOTE (OTHER) - BACKGROUND
[facility] from 9/7/19 @ 3597[facility] History of gestational HTN. Currently taking hydralazine 50 mg and nicardipine 30 mg.

## 2019-09-11 NOTE — DISCHARGE NOTE OB - CARE PROVIDER_API CALL
Yvette Infante)  Obstetrics and Gynecology  [address] [place] [address]  Phone: [phone]  Fax: [phone]  Follow Up Time: 1 week

## 2019-09-11 NOTE — PROVIDER CONTACT NOTE (OTHER) - SITUATION
Pt's most recent vital signs before administering nicardipine: temp 36.6, RR 16, 02 100%, /82, HR 84.

## 2019-09-11 NOTE — PROGRESS NOTE ADULT - ASSESSMENT
30 yo  POD#4 from pLTCS for worsening severe preeclampsia c/b hyperkalemia, hypothermia. Clinical condition improving with improvement of serum potassium.

## 2019-09-11 NOTE — DISCHARGE NOTE OB - CARE PLAN
Goal:	Continued management of BP Goal:	Continued management of BP  Assessment and plan of treatment:	30 y/o  s/p primary low transverse  at 31w4d due to severe preeclampsia. Pregnacy complicated by IUGR, clinical course complicated by postpartum hypothermia and hyperkalemia (7.3), now resolved. The pt's blood pressure is now better controlled with Nicardipine 30mg and hydralazine 50mg. Will continue current BP regimen, and follow-up outpatient. Advised to return if blood pressure exceeds 160/90, experiencing visual changes, severe headache or abdominal pain. Principal Discharge DX:	 delivery due to maternal disorder, delivered, current hospitalization  Goal:	Continued management of BP  Assessment and plan of treatment:	30 y/o  s/p primary low transverse  at 31w4d due to severe preeclampsia. Pregnancy complicated by IUGR, clinical course complicated by postpartum hypothermia and hyperkalemia (7.3), now resolved. The pt's blood pressure is now better controlled with Nicardipine 30mg and hydralazine 50mg. Will continue current BP regimen, and follow-up outpatient. Advised to return if blood pressure exceeds 160/90, experiencing visual changes, severe headache or abdominal pain.  Secondary Diagnosis:	31 weeks gestation of pregnancy  Secondary Diagnosis:	Preeclampsia, severe, third trimester  Secondary Diagnosis:	Hyperkalemia  Secondary Diagnosis:	Hypothermia

## 2019-09-11 NOTE — PROGRESS NOTE ADULT - ASSESSMENT
Postpartum Note,  Section  She is a  29y woman who is now post-operative day: 3    Subjective:  The patient feels well.  She is ambulating.   She is tolerating regular diet.  She denies nausea and vomiting.  She is voiding.  Her pain is controlled.  She reports normal postpartum bleeding.  She is breastfeeding.  She is formula feeding.    Physical exam:    Vital Signs Last 24 Hrs  T(C): 36.8 (11 Sep 2019 20:41), Max: 37 (11 Sep 2019 10:25)  T(F): 98.2 (11 Sep 2019 20:41), Max: 98.6 (11 Sep 2019 10:25)  HR: 91 (11 Sep 2019 20:41) (84 - 101)  BP: 147/85 (11 Sep 2019 20:41) (142/72 - 166/82)  BP(mean): --  RR: 16 (11 Sep 2019 20:41) (16 - 18)  SpO2: 100% (11 Sep 2019 20:41) (100% - 100%)    Gen: NAD  Breast: Soft, nontender, not engorged.  Abdomen: Soft, nontender, no distension , firm uterine fundus at umbilicus.  Incision: Clean, dry, and intact with steri strips  Pelvic: Normal lochia noted  Ext: No calf tenderness    LABS:                        8.7    9.63  )-----------( 236      ( 10 Sep 2019 06:48 )             28.6       Rubella status:     Allergies    No Known Drug Allergies  shellfish (Hives)    Intolerances      MEDICATIONS  (STANDING):  acetaminophen   Tablet .. 975 milliGRAM(s) Oral every 6 hours  heparin  Injectable 5000 Unit(s) SubCutaneous every 12 hours  hydrALAZINE 50 milliGRAM(s) Oral every 8 hours  NIFEdipine XL 30 milliGRAM(s) Oral daily    MEDICATIONS  (PRN):  diphenhydrAMINE 25 milliGRAM(s) Oral every 6 hours PRN Itching  docusate sodium 100 milliGRAM(s) Oral two times a day PRN Stool softening  glycerin Suppository - Adult 1 Suppository(s) Rectal at bedtime PRN Constipation  lanolin Ointment 1 Application(s) Topical every 6 hours PRN Sore Nipples  magnesium hydroxide Suspension 30 milliLiter(s) Oral two times a day PRN Constipation  oxyCODONE    IR 5 milliGRAM(s) Oral every 3 hours PRN Moderate Pain (4 - 6)  oxyCODONE    IR 10 milliGRAM(s) Oral every 3 hours PRN Severe Pain (7 - 10)  simethicone 80 milliGRAM(s) Chew every 4 hours PRN Gas        Assessment and Plan  POD #4   s/p  section  Doing well. BP remains slightly elevated  Encourage ambulation.   nicardipine changed to nifedipineXL 30mg  Continue routine post op care.

## 2019-09-11 NOTE — PROGRESS NOTE ADULT - PROBLEM SELECTOR PLAN 1
- CV: hemodynamically stable, continue hydralazine 50 q8h, and nicardipine 30 XL q8h  - Resp: saturating well   - GI: reg diet  - FEN: potassium normalized  - Heme: HSQ for DVT ppx   - Neuro: status post Keppra for seizure prophylaxis   - : voiding spontaneously    Kole De León MD PGY3

## 2019-09-11 NOTE — DISCHARGE NOTE OB - HOME CARE AGENCY
[facility]    [phone], (for maternal BP monitoring)initial visit will be upon discharge home, a nurse will call to arrange home visit [facility] ([phone]) accepted pt  (for maternal BP monitoring)initial visit will be upon discharge home, a nurse will call to arrange home visit

## 2019-09-11 NOTE — PROGRESS NOTE ADULT - SUBJECTIVE AND OBJECTIVE BOX
INTERVAL HPI/OVERNIGHT EVENTS: Pt seen and examined at bedside.  Overnight, patient received her nicardipine 1 hour late and had elevated SBPs to 160s, she was given 10 of IR procardia in addition to her 30 XL scheduled dose. Pt complaints this morning. Denies headache, blurry vision and spots in her vision.  Ambulating, passing flatus, tolerating regular diet, pain controlled with analgesia, urinating spontaneously  She denies nausea/vomiting/fever/chills/chest pain/SOB/dizziness.    MEDICATIONS  (STANDING):  acetaminophen   Tablet .. 975 milliGRAM(s) Oral every 6 hours  heparin  Injectable 5000 Unit(s) SubCutaneous every 12 hours  hydrALAZINE 50 milliGRAM(s) Oral every 8 hours  niCARdipine 30 milliGRAM(s) Oral every 8 hours    MEDICATIONS  (PRN):  diphenhydrAMINE 25 milliGRAM(s) Oral every 6 hours PRN Itching  docusate sodium 100 milliGRAM(s) Oral two times a day PRN Stool softening  glycerin Suppository - Adult 1 Suppository(s) Rectal at bedtime PRN Constipation  lanolin Ointment 1 Application(s) Topical every 6 hours PRN Sore Nipples  magnesium hydroxide Suspension 30 milliLiter(s) Oral two times a day PRN Constipation  oxyCODONE    IR 5 milliGRAM(s) Oral every 3 hours PRN Moderate Pain (4 - 6)  oxyCODONE    IR 10 milliGRAM(s) Oral every 3 hours PRN Severe Pain (7 - 10)  simethicone 80 milliGRAM(s) Chew every 4 hours PRN Gas      12 point ROS negative except as outlined above    Vital Signs Last 24 Hrs  T(C): 36.6 (11 Sep 2019 05:32), Max: 37 (10 Sep 2019 10:00)  T(F): 97.9 (11 Sep 2019 05:32), Max: 98.6 (10 Sep 2019 10:00)  HR: 89 (11 Sep 2019 05:32) (84 - 101)  BP: 156/84 (11 Sep 2019 05:32) (134/67 - 166/82)  BP(mean): --  RR: 16 (11 Sep 2019 05:32) (16 - 18)  SpO2: 100% (11 Sep 2019 05:32) (99% - 100%)    I&O's Summary        PHYSICAL EXAM:    General: NAD  Pulmonary: Nonlabored breathing, no respiratory distress, CTA-B  Cardiovascular: NSR, no murmurs  Abdominal: soft, NT/ND, +BS, no Pfannenstiel incision, C/D/I, steri strips  : pad with minimal blood  Ext: no edema/tenderness in LE b/l  Lines:      LABS:                          8.7    9.63  )-----------( 236      ( 10 Sep 2019 06:48 )             28.6   baso x      eos x      imm gran x      lymph x      mono x      poly x                            8.7    10.15 )-----------( 229      ( 10 Sep 2019 01:11 )             27.8   baso x      eos x      imm gran x      lymph x      mono x      poly x                            9.8    10.28 )-----------( 255      ( 09 Sep 2019 00:09 )             32.1   baso x      eos x      imm gran x      lymph x      mono x      poly x                            10.4   10.92 )-----------( 262      ( 08 Sep 2019 16:55 )             33.1   baso x      eos x      imm gran x      lymph x      mono x      poly x                      RADIOLOGY & ADDITIONAL TESTS:

## 2019-09-11 NOTE — PROGRESS NOTE ADULT - PROVIDER SPECIALTY LIST ADULT
Anesthesia
Anesthesia
MFKARINA
Nephrology
OB
SICU
OB
OB
SICU
Nephrology

## 2019-09-11 NOTE — DISCHARGE NOTE OB - MEDICATION SUMMARY - MEDICATIONS TO STOP TAKING
I will STOP taking the medications listed below when I get home from the hospital:    progesterone  -- vaginal once a day (at bedtime)

## 2019-09-11 NOTE — DISCHARGE NOTE OB - MEDICATION SUMMARY - MEDICATIONS TO TAKE
I will START or STAY ON the medications listed below when I get home from the hospital:    ibuprofen 600 mg oral tablet  -- 1 tab(s) by mouth every 6 hours, As Needed -for moderate pain MDD:4   -- Do not take this drug if you are pregnant.  It is very important that you take or use this exactly as directed.  Do not skip doses or discontinue unless directed by your doctor.  May cause drowsiness or dizziness.  Obtain medical advice before taking any non-prescription drugs as some may affect the action of this medication.  Take with food or milk.    -- Indication: For Pain    NIFEdipine 30 mg oral tablet, extended release  -- 1 tab(s) by mouth once a day MDD:1  -- Indication: For Maternal hypertension during pregnancy    PNV Prenatal oral tablet  -- 1 tab(s) by mouth once a day  -- Indication: For breastfeeding    hydrALAZINE 50 mg oral tablet  -- 1 tab(s) by mouth every 8 hours MDD:3  -- Indication: For Maternal hypertension during pregnancy

## 2019-09-11 NOTE — DISCHARGE NOTE OB - HOSPITAL COURSE
30 y/o  postpartum day 12 s/p primary low transverse  at 31w4d due to severe preeclampsia. Pregnancy complicated by IUGR, clinical course complicated by postpartum hypothermia and hyperkalemia (7.3). Pt was transferred to SICU where she was infused with calcium gluconate and insulin. Her potassium levels and temperature are now within normal range (4.4, 36.9 respectively), clinical condition is significantly improved.  Blood pressures managed on Nicardipine 30 every 8hr and hydralazine 50mg every 8 hr.  Pt denies any visual changes, headache or RUQ abdominal pain. Physical exam is benign at the time of discharge.

## 2019-09-11 NOTE — PROGRESS NOTE ADULT - SUBJECTIVE AND OBJECTIVE BOX
Postpartum Note,  Section  She is a  29y woman who is now post-operative day: 4	    Subjective:  The patient feels well.  She is ambulating.   She is tolerating regular diet.  She denies nausea and vomiting.  She is voiding.  Her pain is controlled.  She reports normal postpartum bleeding.  She is breastfeeding.  She is formula feeding.    Physical exam:    Vital Signs Last 24 Hrs  T(C): 36.8 (11 Sep 2019 20:41), Max: 37 (11 Sep 2019 10:25)  T(F): 98.2 (11 Sep 2019 20:41), Max: 98.6 (11 Sep 2019 10:25)  HR: 91 (11 Sep 2019 20:41) (84 - 101)  BP: 147/85 (11 Sep 2019 20:41) (142/72 - 166/82)  BP(mean): --  RR: 16 (11 Sep 2019 20:41) (16 - 18)  SpO2: 100% (11 Sep 2019 20:41) (100% - 100%)    Gen: NAD  Breast: Soft, nontender, not engorged.  Abdomen: Soft, nontender, no distension , firm uterine fundus at umbilicus.  Incision: Clean, dry, and intact with steri strips  Pelvic: Normal lochia noted  Ext: No calf tenderness    LABS:                        8.7    9.63  )-----------( 236      ( 10 Sep 2019 06:48 )             28.6       Rubella status:     Allergies    No Known Drug Allergies  shellfish (Hives)    Intolerances      MEDICATIONS  (STANDING):  acetaminophen   Tablet .. 975 milliGRAM(s) Oral every 6 hours  heparin  Injectable 5000 Unit(s) SubCutaneous every 12 hours  hydrALAZINE 50 milliGRAM(s) Oral every 8 hours  NIFEdipine XL 30 milliGRAM(s) Oral daily    MEDICATIONS  (PRN):  diphenhydrAMINE 25 milliGRAM(s) Oral every 6 hours PRN Itching  docusate sodium 100 milliGRAM(s) Oral two times a day PRN Stool softening  glycerin Suppository - Adult 1 Suppository(s) Rectal at bedtime PRN Constipation  lanolin Ointment 1 Application(s) Topical every 6 hours PRN Sore Nipples  magnesium hydroxide Suspension 30 milliLiter(s) Oral two times a day PRN Constipation  oxyCODONE    IR 5 milliGRAM(s) Oral every 3 hours PRN Moderate Pain (4 - 6)  oxyCODONE    IR 10 milliGRAM(s) Oral every 3 hours PRN Severe Pain (7 - 10)  simethicone 80 milliGRAM(s) Chew every 4 hours PRN Gas        Assessment and Plan  POD #   s/p  section  Doing well.  Encourage ambulation.  Continue routine post op care.

## 2019-09-11 NOTE — DISCHARGE NOTE OB - PLAN OF CARE
Continued management of BP 28 y/o  s/p primary low transverse  at 31w4d due to severe preeclampsia. Pregnacy complicated by IUGR, clinical course complicated by postpartum hypothermia and hyperkalemia (7.3), now resolved. The pt's blood pressure is now better controlled with Nicardipine 30mg and hydralazine 50mg. Will continue current BP regimen, and follow-up outpatient. Advised to return if blood pressure exceeds 160/90, experiencing visual changes, severe headache or abdominal pain. 28 y/o  s/p primary low transverse  at 31w4d due to severe preeclampsia. Pregnancy complicated by IUGR, clinical course complicated by postpartum hypothermia and hyperkalemia (7.3), now resolved. The pt's blood pressure is now better controlled with Nicardipine 30mg and hydralazine 50mg. Will continue current BP regimen, and follow-up outpatient. Advised to return if blood pressure exceeds 160/90, experiencing visual changes, severe headache or abdominal pain.

## 2019-09-12 VITALS
SYSTOLIC BLOOD PRESSURE: 148 MMHG | HEART RATE: 98 BPM | OXYGEN SATURATION: 100 % | RESPIRATION RATE: 18 BRPM | DIASTOLIC BLOOD PRESSURE: 90 MMHG | TEMPERATURE: 99 F

## 2019-09-12 LAB
BACTERIA BLD CULT: SIGNIFICANT CHANGE UP
BACTERIA BLD CULT: SIGNIFICANT CHANGE UP

## 2019-09-12 RX ORDER — HYDRALAZINE HCL 50 MG
1 TABLET ORAL
Qty: 90 | Refills: 0
Start: 2019-09-12 | End: 2019-10-11

## 2019-09-12 RX ORDER — IBUPROFEN 200 MG
1 TABLET ORAL
Qty: 50 | Refills: 1
Start: 2019-09-12

## 2019-09-12 RX ORDER — PROGESTERONE 200 MG/1
0 CAPSULE, LIQUID FILLED ORAL
Qty: 0 | Refills: 0 | DISCHARGE

## 2019-09-12 RX ORDER — NIFEDIPINE 30 MG
1 TABLET, EXTENDED RELEASE 24 HR ORAL
Qty: 30 | Refills: 1
Start: 2019-09-12 | End: 2019-11-10

## 2019-09-12 RX ADMIN — Medication 50 MILLIGRAM(S): at 04:46

## 2019-09-12 RX ADMIN — Medication 30 MILLIGRAM(S): at 06:32

## 2019-09-12 RX ADMIN — Medication 50 MILLIGRAM(S): at 13:17

## 2019-09-12 RX ADMIN — HEPARIN SODIUM 5000 UNIT(S): 5000 INJECTION INTRAVENOUS; SUBCUTANEOUS at 06:32

## 2019-09-16 LAB — SURGICAL PATHOLOGY STUDY: SIGNIFICANT CHANGE UP

## 2019-10-08 DIAGNOSIS — O14.93 UNSPECIFIED PRE-ECLAMPSIA, THIRD TRIMESTER: ICD-10-CM

## 2019-10-08 DIAGNOSIS — O36.5930 MATERNAL CARE FOR OTHER KNOWN OR SUSPECTED POOR FETAL GROWTH, THIRD TRIMESTER, NOT APPLICABLE OR UNSPECIFIED: ICD-10-CM

## 2019-10-08 DIAGNOSIS — O24.813 OTHER PRE-EXISTING DIABETES MELLITUS IN PREGNANCY, THIRD TRIMESTER: ICD-10-CM

## 2019-10-08 DIAGNOSIS — Z3A.31 31 WEEKS GESTATION OF PREGNANCY: ICD-10-CM

## 2021-03-17 NOTE — OB RN PATIENT PROFILE - AS SC BRADEN FRICTION
----- Message from Corinne A Smogoleski, RN sent at 3/17/2021  9:29 AM CDT -----    ----- Message -----  From: Randa Boothe MD  Sent: 3/17/2021   9:28 AM CDT  To: [facility] Nurse Msg Pool    Your mammogram was normal    
Left patient HIPAA friendly message. Advised to call back with any questions.  
No
(3) no apparent problem

## 2022-09-22 NOTE — OB RN INTRAOPERATIVE NOTE - NS_NEWBORNRN1_OBGYN_ALL_OB_FT
[Dear  ___] : Dear  [unfilled], [Consult Letter:] : I had the pleasure of evaluating your patient, [unfilled]. [( Thank you for referring [unfilled] for consultation for _____ )] : Thank you for referring [unfilled] for consultation for [unfilled] [Please see my note below.] : Please see my note below. [Consult Closing:] : Thank you very much for allowing me to participate in the care of this patient.  If you have any questions, please do not hesitate to contact me. [Sincerely,] : Sincerely, [FreeTextEntry3] : Don\par \par Sigifredo Maciel MD\par \par Gastroenterology\par [facility]\par [facility]\par \par  SHARON

## 2023-05-04 NOTE — OB RN DELIVERY SUMMARY - NS_DELIVERYASSIST1_OBGYN_ALL_OB_FT
Problem: Chronic Conditions and Co-morbidities  Goal: Patient's chronic conditions and co-morbidity symptoms are monitored and maintained or improved  Flowsheets (Taken 5/4/2023 0242)  Care Plan - Patient's Chronic Conditions and Co-Morbidity Symptoms are Monitored and Maintained or Improved: Monitor and assess patient's chronic conditions and comorbid symptoms for stability, deterioration, or improvement KARLA

## 2024-02-06 NOTE — PATIENT PROFILE ADULT - NSPROPTRIGHTREPPHONE_GEN_A_NUR
At approximately 1438 patient is assisted to side of stretcher, sits for 3-5 minutes then ambulatory to toilet to void with minimal assist from his spouse. Patient comments to nurse that his surgical dressing came off, then turns to void.  At approximately 1445 spouse pulls bathroom call bell.  On this nurse's arrival, patient is standing, but says that he feels week.  Spouse says that patient fell onto the commode.  Patient is assisted back to chair, then back to stretcher to lay flat/supine.   Patient denies injury.  No abrasions, no skin tears, no bruising noted.   See Vital signs flowsheet for this time period. 1630, patient reports feeling better after having eaten a sandwich, drank a soda and rested.  Ortho static vs good, patient wishes to go home.  Dr Lugo notified with no further orders received.  
[phone]
